# Patient Record
Sex: MALE | Race: WHITE | NOT HISPANIC OR LATINO | Employment: FULL TIME | URBAN - METROPOLITAN AREA
[De-identification: names, ages, dates, MRNs, and addresses within clinical notes are randomized per-mention and may not be internally consistent; named-entity substitution may affect disease eponyms.]

---

## 2019-01-31 ENCOUNTER — PATIENT OUTREACH (OUTPATIENT)
Dept: PLASTIC SURGERY | Facility: CLINIC | Age: 34
End: 2019-01-31

## 2019-01-31 DIAGNOSIS — F64.0 GENDER DYSPHORIA IN ADULT: Primary | ICD-10-CM

## 2019-01-31 NOTE — PROGRESS NOTES
[facility]:  Care Coordination Note     SITUATION   Patient is a 33 year old who is receiving support for:  Clinic Care Coordination - Initial  .    BACKGROUND     New patient requesting top surgery consult with Dr. Lucas.     ASSESSMENT     Surgery              [facility] [place]  [facility] ([facility] En[place]ment: Enrolled  Patient has a therapist: Yes  Name of therapist: Meenakshi Rodriguez at [facility]  Letter of support #1: Requested  Surgery being considered: Yes  Mastectomy: Yes    Pt reports she does not smoke, no diabetes.       PLAN          Nursing Interventions:   [facility] program and services reviewed; [facility] referral placed. Pt registration updated and scheduled for consultation. Surgical process reviewed including letter of support, insurance coverage, PA and scheduling process.     Follow-up plan:  Pt to work with therapist to obtain a letter of support & will bring to consultation.        Arden Wylie

## 2019-02-11 ENCOUNTER — PATIENT OUTREACH (OUTPATIENT)
Dept: PLASTIC SURGERY | Facility: CLINIC | Age: 34
End: 2019-02-11

## 2019-02-11 NOTE — PROGRESS NOTES
Pt LVM stating they have questions regarding insurance. Pt was unable to MyChart message and requested writer start a MyChart message so they can respond.

## 2019-04-02 ENCOUNTER — OFFICE VISIT (OUTPATIENT)
Dept: FAMILY MEDICINE | Facility: CLINIC | Age: 34
End: 2019-04-02
Payer: COMMERCIAL

## 2019-04-02 VITALS
SYSTOLIC BLOOD PRESSURE: 119 MMHG | TEMPERATURE: 98 F | DIASTOLIC BLOOD PRESSURE: 76 MMHG | HEART RATE: 80 BPM | OXYGEN SATURATION: 98 % | BODY MASS INDEX: 32.77 KG/M2 | WEIGHT: 209.2 LBS

## 2019-04-02 DIAGNOSIS — F64.0 GENDER DYSPHORIA IN ADULT: Primary | ICD-10-CM

## 2019-04-02 DIAGNOSIS — Z97.5 IUD (INTRAUTERINE DEVICE) IN PLACE: ICD-10-CM

## 2019-04-02 DIAGNOSIS — Z76.89 ENCOUNTER TO ESTABLISH CARE: ICD-10-CM

## 2019-04-02 DIAGNOSIS — L30.9 ECZEMA, UNSPECIFIED TYPE: ICD-10-CM

## 2019-04-02 ASSESSMENT — ENCOUNTER SYMPTOMS: HEADACHES: 0

## 2019-04-02 NOTE — PATIENT INSTRUCTIONS
Please see Dr Houston for ongoing primary care-I recommend a general physical exam within the next few months  You will need to see her at least prior to surgery-this may be a good time for the physical exam  Let us know if you need more potent steroid cream in the future for eczema  Keep moisturizing!!    It was great meeting you today

## 2019-04-02 NOTE — PROGRESS NOTES
HPI       Mami Gotti is a 33 year old who presents to establish care.  Also present today was resident physician Dr. Rishabh Houston.    Gender dysphoria:   -Referred from Arden Wylie at [facility] for primary care  -Consultation in July with Dr. Lucas for top surgery  -Haven't needed medical care for quite a while and is here to establish care and prepare for surgery  -Pronouns are they/them especially to people that do not know them well, may change and can fluctuate  -Not interested in any hormonal treatment at this time, and not discussed further    PMH:  -Had IUD 2+ years ago through [facility]  -Last pap around 2 years ago-normal. No menstrual bleeding x 2 years  -Eczema-most severe under breasts, one of their reasons for top surgery. Wears sports bra.   -Occasional migraine - on no meds for this    PSH: None    Family Hx: Dad recently diagnosed with melanoma and lung cancer    Meds: Zyrtec and topical steroid-haven't used topicals in a while    Social history:   Lives in [place] with partner  Works at [employer] in [place]  Grew up in [place]    Wondering about general check up    Sees a therapist, Meenakshi Rodriguez, since last August.    Problem, Medication and Allergy Lists were reviewed and updated if needed..    Patient is a new patient to this clinic and so I reviewed/updated the Past Medical History, the Family History and the Social History   Past Medical History:   Diagnosis Date     Bulimia nervosa 2000    in [employer] program     Eczema      Family History   Problem Relation Age of Onset     Hypertension Mother      Hypertension Maternal Grandmother      Hypertension Maternal Grandfather      Glaucoma Father      Cancer Father 72        melanoma and lung cancer     Social History     Socioeconomic History     Marital status: Single     Spouse name: None     Number of children: None     Years of education: None     Highest education level: None   Occupational History     None   Social Needs      Financial resource strain: None     Food insecurity:     Worry: None     Inability: None     Transportation needs:     Medical: None     Non-medical: None   Tobacco Use     Smoking status: Former Smoker     Packs/day: 1.00     Years: 6.00     Pack years: 6.00     Types: Cigarettes     Last attempt to quit: 1/3/2013     Years since quittin.2     Smokeless tobacco: Never Used   Substance and Sexual Activity     Alcohol use: No     Drug use: No     Sexual activity: Yes     Partners: Female   Lifestyle     Physical activity:     Days per week: None     Minutes per session: None     Stress: None   Relationships     Social connections:     Talks on phone: None     Gets together: None     Attends Latter-day service: None     Active member of club or organization: None     Attends meetings of clubs or organizations: None     Relationship status: None     Intimate partner violence:     Fear of current or ex partner: None     Emotionally abused: None     Physically abused: None     Forced sexual activity: None   Other Topics Concern     Parent/sibling w/ CABG, MI or angioplasty before 65F 55M? No   Social History Narrative     None          Review of Systems:   Review of Systems   Eyes: Negative for visual disturbance.   Skin: Positive for rash (ezcema ).   Neurological: Negative for headaches.          Physical Exam:     Vitals:    19 0803   BP: 119/76   Pulse: 80   Temp: 98  F (36.7  C)   TempSrc: Oral   SpO2: 98%   Weight: 94.9 kg (209 lb 3.2 oz)     Body mass index is 32.77 kg/m .  Vitals were reviewed and were normal     Physical Exam   Constitutional: Mami Gotti is oriented to person, place, and time. Mami Gotti appears well-developed and well-nourished. No distress.   Appears anxious, pleasant, joking and smiling   HENT:   Head: Normocephalic and atraumatic.   Eyes: Conjunctivae are normal.   Pulmonary/Chest: Effort normal.   Neurological: Mami Gotti is alert and  oriented to person, place, and time.   Skin: Rash (on dorsum of both hands, mildly erythematous scaling with no excoriations, consitent with eczema ) noted. Mami Gotti is not diaphoretic.   Psychiatric: Maim Gotti has a normal mood and affect. Judgment and thought content normal.   Anxious appearing   Vitals reviewed.      Results:   No testing ordered today    Assessment and Plan    Mami is a 33 year old who seen today to establish care and to discuss future surgery and goals/gender support.     Diagnoses and all orders for this visit:    1. Gender dysphoria in adult  No interest in starting hormones yet but this may change. Seeing Dr. Lucas for potential top surgery, consultation this summer. Sees a therapist, Meenakshi Rodriguez, since last August, encouraged patient to continue seeing therapist for support.     2. Encounter to establish care  Will follow up with Dr. Houston in the next few weeks- month(s) for a general physical exam. They will also need to be seen for a pre op exam once a date is set for surgery with Dr. Lucas.     3. Eczema, unspecified type  Chronic, controlled, seems to be clearing up (worsens in winter months). Uses eucerin liberally, told patient to take showers in colder temps along with continuing to moisturize. They may need more steroid cream at next visit, but not interested at this time    -- RTC within the next few months for physical exam, ongoing primary care [facility]    The visit was 40 minutes > 1/2 of the visit was spent in counseling and coordination of care.

## 2019-07-02 ENCOUNTER — PRE VISIT (OUTPATIENT)
Dept: SURGERY | Facility: CLINIC | Age: 34
End: 2019-07-02

## 2019-07-02 NOTE — TELEPHONE ENCOUNTER
FUTURE VISIT INFORMATION      FUTURE VISIT INFORMATION:    Date: 7/9/19    Time: 9:00am    [place] [facility]  REFERRAL INFORMATION:    Referring provider:  Self    Referring providers clinic:  N/A    Reason for visit/diagnosis  Top surgery    RECORDS REQUESTED FROM:       No records to collect

## 2019-07-09 ENCOUNTER — MEDICAL CORRESPONDENCE (OUTPATIENT)
Dept: HEALTH INFORMATION MANAGEMENT | Facility: CLINIC | Age: 34
End: 2019-07-09

## 2019-07-09 ENCOUNTER — OFFICE VISIT (OUTPATIENT)
Dept: PLASTIC SURGERY | Facility: CLINIC | Age: 34
End: 2019-07-09
Payer: COMMERCIAL

## 2019-07-09 ENCOUNTER — PATIENT OUTREACH (OUTPATIENT)
Dept: PLASTIC SURGERY | Facility: CLINIC | Age: 34
End: 2019-07-09

## 2019-07-09 VITALS — WEIGHT: 185.4 LBS | HEIGHT: 67 IN | BODY MASS INDEX: 29.1 KG/M2

## 2019-07-09 DIAGNOSIS — F64.0 GENDER DYSPHORIA IN ADULT: Primary | ICD-10-CM

## 2019-07-09 RX ORDER — DUPILUMAB 300 MG/2ML
INJECTION, SOLUTION SUBCUTANEOUS
COMMUNITY
Start: 2019-06-19 | End: 2021-04-03

## 2019-07-09 RX ORDER — TACROLIMUS 1 MG/G
OINTMENT TOPICAL PRN
COMMUNITY
Start: 2019-05-30 | End: 2021-04-03

## 2019-07-09 ASSESSMENT — MIFFLIN-ST. JEOR: SCORE: 1578.6

## 2019-07-09 ASSESSMENT — PAIN SCALES - GENERAL: PAINLEVEL: NO PAIN (0)

## 2019-07-09 NOTE — LETTER
"2019       RE: Mami Gotti  [address]  [facility] [address]     Dear Colleague,    Thank you for referring your patient, Mami Gotti, to the [facility] PLASTIC [facility] RECONSTRUCTIVE SURGERY at [facility]. Please see a copy of my visit note below.    PLASTICS NEW TOP   HPI: This is a 33 year old biological female who identifies as nonbinary, though is flexible with their identity, with a history of gender dysphoria and bulimia nervosa who presents today on their own for a consultation for top surgery. They prefer pronouns they/them and preferred name is Mami or Roscoe. They were referred by the internet and previous patients. Their therapist is Meenakshi Rodriguez at [facility] in [place], who wrote their letter of support which they brought with them today. Their primary care is with [facility]. They are not on testosterone and do not plan to begin hormones at present. They have been transitioning and living their chosen gender identity/role for the past year. They bind with GC2B, which they began about a year ago. They do not bind all the time. No breast lumps, skin puckering, nipple drainage, or other breast problems. No history of breast imaging, including mammogram or breast ultrasound.     Medical Hx: Gender dysphoria. They are recovering from bulimia nervosa with treatment 7 years ago, with a recurrence over the past few months; this is being monitored by their therapist. They have a history of anxiety, but are not on medication. No history of depression. History of eczema. No history of asthma, diabetes mellitus, or GERD. Mami has an IUD. They are a redhead, and have \"sensitive skin\" but no bleeding, clotting, healing or scarring problems.     Surgical Hx: History of third molar extractions. Oral surgeries as a child. No other surgical history, including appendectomy or tonsillectomy.     Family Hx: Maternal grandmother  of either breast or " "lung cancer. No known family history of ovarian cancer. Father was diagnosed with melanoma and lung cancer in the past year. Mom and brothers are healthy.     Social Hx: Occupation: Works at [employer] as a  among other tasks, which requires heavy lifting. Relationship status/family: Partner's name is Kelsi who will provide postop cares; Mami lives with their partner. Parents are retired. Family is in the medical field- nursing. Smoking status: Former smoker, nonsmoker for several years. Alcohol use: sober for several years. Diet: Omnivore, red and white meat are protein sources. Moderate processed food, main cook at home,  but low fast food. No caffeine for the past year. Exercise: Has a physical job, no other exercise. Sleep: 8-10 hours per night. No insomnia, nightmares or PTSD.     Vitals: Ht 1.702 m (5' 7\")   Wt 94.8 kg (209 lb)   BMI 32.73 kg/m     PE: General: Height: 5' 7\" Weight: 209 lbs 0 oz   Chest: Tattoo across the top of the chest. Nice upper chest contour. Left breast is slightly (100-200 grams) larger than the right. Faint striae bilaterally. Grade 3 ptosis. Breasts are close to the midline. Inframammary folds are equal bilaterally but are 3-4 cm lower than pectoralis origin. Moderate lateral thoracic rolls. Tattoo on right flank. Fibrous breast tissue. No lymphadenopathy or masses.   Photos taken with consent.     A&P: 33 year old biological female who is nonbinary/gender fluid who is a good candidate for gender affirming top surgery with one of the following: bilateral simple mastectomy without nipple graft reconstruction vs. subcutaneous mastectomy without nipple graft reconstruction vs. breast reduction without nipple graft  reconstruction. Most likely, they will need bilateral simple with NO nipple graft reconstruction, depending on intraoperative findings and the patient's desired outcome. The patient will need a pre-op mammogram and they will also need a history and physical. Their letter " of support was written by their therapist; they brought this with them today. Given their bulimia nervosa, they had questions about how weight gain affects post-op results which we discussed.     They also met with our Transgender Coordinator to discuss communications with staff and timeline. Any further discussion of risks and complications will be reviewed during the pre-op visit.     Since we received the therapist letter of support today, once we receive the mammogram results we will initiate the prior authorization process.     According to [place] Case Law and [facility] standards of care with an appropriate letter of support form a mental health provider top surgery/mastectomy is medically necessary for the treatment of gender dysphoria.     Total time = 45 minutes, over half of which spent discussing surgical options    This note was prepared on behalf of Rajwinder Lucas MD, by Ainsley Elaine, a trained medical scribe, based on my observations and the provider's statements to me.     Again, thank you for allowing me to participate in the care of your patient.      Sincerely,    Rajwinder Lucas MD

## 2019-07-09 NOTE — PROGRESS NOTES
"PLASTICS NEW TOP   HPI: This is a 33 year old biological female who identifies as nonbinary, though is flexible with their identity, with a history of gender dysphoria and bulimia nervosa who presents today on their own for a consultation for top surgery. They prefer pronouns they/them and preferred name is Mami or Roscoe. They were referred by the internet and previous patients. Their therapist is Meenakshi Rodriguez at [facility] in [place], who wrote their letter of support which they brought with them today. Their primary care is with [facility]. They are not on testosterone and do not plan to begin hormones at present. They have been transitioning and living their chosen gender identity/role for the past year. They bind with GC2B, which they began about a year ago. They do not bind all the time. No breast lumps, skin puckering, nipple drainage, or other breast problems. No history of breast imaging, including mammogram or breast ultrasound.     Medical Hx: Gender dysphoria. They are recovering from bulimia nervosa with treatment 7 years ago, with a recurrence over the past few months; this is being monitored by their therapist. They have a history of anxiety, but are not on medication. No history of depression. History of eczema. No history of asthma, diabetes mellitus, or GERD. Mami has an IUD. They are a redhead, and have \"sensitive skin\" but no bleeding, clotting, healing or scarring problems.     Surgical Hx: History of third molar extractions. Oral surgeries as a child. No other surgical history, including appendectomy or tonsillectomy.     Family Hx: Maternal grandmother  of either breast or lung cancer. No known family history of ovarian cancer. Father was diagnosed with melanoma and lung cancer in the past year. Mom and brothers are healthy.     Social Hx: Occupation: Works at [employer] as a  among other tasks, which requires heavy lifting. Relationship status/family: Partner's name is Kelsi who will " "provide postop cares; Mami lives with their partner. Parents are retired. Family is in the medical field- nursing. Smoking status: Former smoker, nonsmoker for several years. Alcohol use: sober for several years. Diet: Omnivore, red and white meat are protein sources. Moderate processed food, main cook at home,  but low fast food. No caffeine for the past year. Exercise: Has a physical job, no other exercise. Sleep: 8-10 hours per night. No insomnia, nightmares or PTSD.     Vitals: Ht 1.702 m (5' 7\")   Wt 94.8 kg (209 lb)   BMI 32.73 kg/m    PE: General: Height: 5' 7\" Weight: 209 lbs 0 oz   Chest: Tattoo across the top of the chest. Nice upper chest contour. Left breast is slightly (100-200 grams) larger than the right. Faint striae bilaterally. Grade 3 ptosis. Breasts are close to the midline. Inframammary folds are equal bilaterally but are 3-4 cm lower than pectoralis origin. Moderate lateral thoracic rolls. Tattoo on right flank. Fibrous breast tissue. No lymphadenopathy or masses.   Photos taken with consent.     A&P: 33 year old biological female who is nonbinary/gender fluid who is a good candidate for gender affirming top surgery with one of the following: bilateral simple mastectomy without nipple graft reconstruction vs. subcutaneous mastectomy without nipple graft reconstruction vs. breast reduction without nipple graft  reconstruction. Most likely, they will need bilateral simple with NO nipple graft reconstruction, depending on intraoperative findings and the patient's desired outcome. The patient will need a pre-op mammogram and they will also need a history and physical. Their letter of support was written by their therapist; they brought this with them today. Given their bulimia nervosa, they had questions about how weight gain affects post-op results which we discussed.     They also met with our Transgender Coordinator to discuss communications with staff and timeline. Any further discussion of " risks and complications will be reviewed during the pre-op visit.     Since we received the therapist letter of support today, once we receive the mammogram results we will initiate the prior authorization process.     According to [place] Case Law and [facility] standards of care with an appropriate letter of support form a mental health provider top surgery/mastectomy is medically necessary for the treatment of gender dysphoria.     Total time = 45 minutes, over half of which spent discussing surgical options    This note was prepared on behalf of Rajwinder Lucas MD, by Ainsley Elaine, a trained medical scribe, based on my observations and the provider's statements to me.

## 2019-07-09 NOTE — PROGRESS NOTES
[facility]:  Care Coordination Note     SITUATION   Patient is a 33 year old who is receiving support for:  Clinic Care Coordination - Initial (Top surgery consultation - [facility])  .    BACKGROUND     Pt attended hospitals consultation with .     Pt previously established care with [facility].     Pt prefers strangers use They/Them Pronouns and not gender them.     ASSESSMENT     Surgery              [facility] Assessment  [facility] [place]: Enrolled  Patient has a therapist: Yes  Name of therapist: Meenakshi Rodriguez at [facility]  Therapist's phone number: [phone]  Letter of support #1: Received  Letter #1 Date: 07/06/19  Surgery being considered: Yes  Mastectomy: Yes          PLAN          Nursing Interventions:   [facility] program and services discussed with patient. Educational surgical packet provided and reviewed with patient. Process for accessing surgery discussed, including: [facility] standards of care, letters of support, treatment plan action steps, PA insurance process, surgery scheduling, and approximate timeline.     Pt has letter of support in chart, which is adequate for PA. AMI and photography consent signed by patient. Pt questions answered within scope of practice.     Follow-up plan:  See Dr. Lucas's note.    Letter is adequate for PA.        Arden Wylie

## 2019-07-09 NOTE — NURSING NOTE
"Chief Complaint   Patient presents with     Consult     Pt here for consult for top surgery       Vitals:    07/09/19 0857   Weight: 94.8 kg (209 lb)   Height: 1.702 m (5' 7\")       Body mass index is 32.73 kg/m .      JENAE SmartT    No vitals taken per provider  "

## 2019-07-25 DIAGNOSIS — F64.0 GENDER DYSPHORIA IN ADULT: Primary | ICD-10-CM

## 2019-07-26 NOTE — PROGRESS NOTES
Order placed for screening mammogram per surgical recommendation (see note from 07/09/19). Will need to be seen for H&P prior to proposed surgery.     Rishabh Houston MD   [facility] PGY-2  [phone]

## 2019-07-31 ENCOUNTER — HOSPITAL ENCOUNTER (OUTPATIENT)
Dept: MAMMOGRAPHY | Facility: CLINIC | Age: 34
Discharge: HOME | End: 2019-07-31
Attending: FAMILY MEDICINE | Admitting: FAMILY MEDICINE
Payer: COMMERCIAL

## 2019-07-31 DIAGNOSIS — F64.0 GENDER DYSPHORIA IN ADULT: ICD-10-CM

## 2019-07-31 PROCEDURE — 77067 SCR MAMMO BI INCL CAD: CPT

## 2019-08-01 ENCOUNTER — PATIENT OUTREACH (OUTPATIENT)
Dept: PLASTIC SURGERY | Facility: CLINIC | Age: 34
End: 2019-08-01

## 2019-08-01 NOTE — PROGRESS NOTES
[facility]:  Care Coordination Note     SITUATION   Mami Gotti is a 33 year old adult who is receiving support for:  Gender Dysphoria.    BACKGROUND     Pt attended top consultation with .     ASSESSMENT     Surgery              [facility] Assessment  [facility] ([facility] Enrollment: Enrolled  Name of therapist: Meenakshi Rodriguez at [facility]  Therapist's phone number: [phone]  Letter of support #1: Received  Letter #1 Date: 07/06/19  Surgery being considered: Yes  Mastectomy: Yes  Mammogram completed: Yes(7/31/19 Negative)  7/31/19 11:54 AM [id number] [facility] [place] [facility]    Assessment Category     Negative [1]   PACS Images      Show images for Screening Mammogram Digital Bilateral   Study Result     SCREENING MAMMOGRAM, BILATERAL, DIGITAL w/CAD - 7/31/2019 11:54 AM.     BREAST SYMPTOMS: No current breast complaints.      COMPARISON:  Baseline.     BREAST DENSITY: Heterogeneously dense.     COMMENTS: No findings of suspicion for malignancy.                                                                       IMPRESSION: BI-RADS CATEGORY: 1 - Negative.     RECOMMENDED FOLLOW-UP: Annual Mammography.  Recommend routine annual screening mammography.     Exam results letter mailed to patient.                PLAN     Nursing Interventions: LOS previously reviewed by trans care coordinator, adequate and ready for PA. Mammogram complete 7/31/19 results negative.       Follow-up plan:  Message routed to trans care coordinator and . Ready to PA.       Lore Ho

## 2019-08-06 DIAGNOSIS — F64.0 GENDER DYSPHORIA IN ADOLESCENT AND ADULT: Primary | ICD-10-CM

## 2019-08-06 RX ORDER — CLINDAMYCIN PHOSPHATE 900 MG/50ML
900 INJECTION, SOLUTION INTRAVENOUS
Status: CANCELLED | OUTPATIENT
Start: 2019-08-06

## 2019-08-06 RX ORDER — CLINDAMYCIN PHOSPHATE 900 MG/50ML
900 INJECTION, SOLUTION INTRAVENOUS SEE ADMIN INSTRUCTIONS
Status: CANCELLED | OUTPATIENT
Start: 2019-08-06

## 2019-08-07 ENCOUNTER — TELEPHONE (OUTPATIENT)
Dept: SURGERY | Facility: CLINIC | Age: 34
End: 2019-08-07

## 2019-08-07 NOTE — TELEPHONE ENCOUNTER
Left message to schedule procedure with Dr Rome Lucas     Details left with my direct contact number for scheduling.     Nella Reed  [facility] Surgical Coordinator  [phone]

## 2019-08-08 ENCOUNTER — TELEPHONE (OUTPATIENT)
Dept: SURGERY | Facility: CLINIC | Age: 34
End: 2019-08-08

## 2019-08-08 NOTE — TELEPHONE ENCOUNTER
Spoke with patient to schedule surgery with Dr Rome Lucas      Surgery was scheduled on 9/25 at [facility]    Patient will have pre-surgery visit with PCP on      -Patient advised H&P is needed or surgery cancellation may occur    Post-Op care appointment scheduled?  YES on 10/1    Patient is aware a / is needed day of surgery.     Surgery packet was sent via [employer], patient has my direct contact information for any further questions.     Nella Reed  Surgical [facility] Coordinator  [phone]

## 2019-08-08 NOTE — TELEPHONE ENCOUNTER
Spoke with patient, they are happy to know that surgery dates are available Sept/Oct. Mami will check with work to see what time frame works best for surgery.

## 2019-08-16 ENCOUNTER — TELEPHONE (OUTPATIENT)
Dept: SURGERY | Facility: CLINIC | Age: 34
End: 2019-08-16

## 2019-08-16 NOTE — TELEPHONE ENCOUNTER
received VM from patient, would like quotes, will send message to Dr Lucas, patient can also go to  for surgery

## 2019-08-16 NOTE — TELEPHONE ENCOUNTER
received Ginny LUTZ denial #[id number]  because this is not a covered service under patients policy., not denied for any other reason

## 2019-08-29 ENCOUNTER — TELEPHONE (OUTPATIENT)
Dept: SURGERY | Facility: CLINIC | Age: 34
End: 2019-08-29

## 2019-08-29 NOTE — TELEPHONE ENCOUNTER
received another phone call from patient, asking about quotes, have not received yet, email to Guadalupe Montoya Ejay, Jolene

## 2019-09-10 ENCOUNTER — OFFICE VISIT (OUTPATIENT)
Dept: PLASTIC SURGERY | Facility: CLINIC | Age: 34
End: 2019-09-10
Payer: COMMERCIAL

## 2019-09-10 DIAGNOSIS — F64.0 GENDER DYSPHORIA IN ADULT: Primary | ICD-10-CM

## 2019-09-10 ASSESSMENT — PAIN SCALES - GENERAL: PAINLEVEL: NO PAIN (0)

## 2019-09-10 NOTE — PROGRESS NOTES
PLASTICS PRE-OP  This is a 34 year old year old nonbinary individual who presents with their partner for their pre-op visit prior to bilateral simple mastectomy without nipple graft reconstruction scheduled for 9/25/2019. The patient has had a negative mammogram, and their letter of support has been received from their therapist. History and physical are pending; scheduled for next week.     My RN, Lore Hawk, discussed periop instructions with the patient including: not eating anything 8 hours prior to surgery, drinking clear liquids up to 2 hours before surgery except for morning medications with a sip of water, the preop shower with surgical soap, and wearing a button- or zip-up shirt on the day of surgery. She also instructed Mami to not take any NSAIDs the week prior to surgery, but they may take Tylenol post-op for pain as needed. She also gave the patient a folder with information on vannesa-op topics, including where the surgery will be.     I discussed the following with the patient; preop, intraop and postop phases of care on the day of surgery, the placement of a bladder catheter during surgery that will likely be removed in recovery, postop cares and limitations with relation to home and work settings, 5-lb weight restriction for the first 3 weeks postop, and how long to maintain limited activities. We also discussed Zofran, oxycodone, Z-mony, and antipruritics which will be prescribed. We discussed that preventing constipation will be their responsibility, and we discussed methods such as aloe, prune juice or Miralax.     In addition, we went over the possible risks and complications involved with this elective procedure. These include but are not limited to: infection, bleeding, hematoma/seroma formation, poor healing - including dehiscence or hypertrophic scarring. Altered chest sensation - either hypo or hypersensitive, residual deformities and asymmetries, possible further surgical revisions,  possible injury to surrounding neurovascular and musculoskeletal structures, including intra-axillary or intra-thoracic, anesthetic risks such as DVT/PE or cardiopulmonary events.      I gave Mami some samples of products to trial, that would be used post op, that hopefully will not irritate their skin. They will let me know if there are any significant skin reactions given their eczema. They also will bring FMLA paperwork either on the DOS or to their first po-op visit in clinic. All questions were answered. They will bring any other questions that arise to the day of surgery.    Total time = 20 minutes, all spent educating about upcoming procedure.    This note was prepared on behalf of Rajwinder Lucas MD, by Ainsley Bella, a trained medical scribe, based on my observations and the provider's statements to me.

## 2019-09-10 NOTE — PATIENT INSTRUCTIONS
Bilateral Mastectomy   Pre and Post op Surgery Instructions     You are scheduled for Bilateral mastectomy without nipple grafting on 9/25/19 at 1:00pm.     You will need to arrive at 11:00am at the [facility].  [facility] [place] [address]. You can park in the Green Lot and check in on 3rd floor. (See attached map).     Before Surgery:  - 8 hours prior to surgery stop eating solid food. You can continue to drink clear liquids (water, apple juice, Gatorade) up to 2 hours before surgery. If you have any medications that need to be taken in this timeframe, you can take them with a small sip of water    - No Ibuprofen, Advil, Aleve, Naproxen, Motrin, Aspirin for 7 days prior to surgery. If you are having pain in the week before surgery Tylenol is okay to take.    - Wear a button up or zip up shirt    - Wash with surgical soap:      Showering with an antiseptic soap prior to an invasive procedure will decrease the  bacteria that is normally found on the skin.     Using 1/2 of the bottle for each application.  Be sure to use the whole bottle before coming to surgery.    The evening before surgery, shower or bathe as you normally would, using your preferred soap.  Give special attention to areas where the incisions will be made. Rinse thoroughly.  You may wash your hair with your regular shampoo.     Next wash your entire body, from the chin down, with the special antiseptic soap (full strength) using a freshly laundered clean washcloth, again giving special attention to areas where incisions will be made.    Rinse thoroughly and dry off using a freshly laundered clean towel.     Wear clean clothes/pajamas and sleep on clean sheets    Repeat steps 2 and 3 in the morning before coming to surgery (using the rest of the bottle of soap).      Events of day:   - After you check in you will meet with a pre-op nurse. They will go over your medications, review your H&P  "(pre-op physical), have you change into a paper gown, and your chest will be wiped again with additional soap (some people have a reaction to the soap, if you have a reaction to the surgical soap let the nurse know).     - They will also place compression boots on both legs to help decrease risk of blood clots.     - You may be asked to complete a pregnancy test. If you have had no exposure to sperm, you can decline.    -You will meet with the anesthesiologists and nurse anesthetist who will be keeping you asleep during surgery, placing an IV, and monitoring you throughout the surgery.    -You will meet with the RN as you are being moved into operating room, they will be helping to position you and keep you comfortable during the surgery.     - Dr. Lucas will meet you in the pre-op area to discuss any questions about surgery, make markings on your chest for planning, and to sign your consent.     - A catheter will be placed after you are sleeping and is typically removed before you wake. The longest this would typically be in is in the post-op recovery room if needed.     - There will be straps and pillows to help position you and keep you in place.    - The tissue that is removed  will be sent to pathology and then discarded.     -STEFF drains will be placed (one in each armpit) and a pain catheter will be placed in the center of your chest.     -Closure is done with dissolvable sutures under the skin and is then sealed with glue, and tape.    - Post-Op medications you will be given in addition to the anesthesia include; Zofran (nausea), Oxycodone (pain), Z-mony (infection), and antipruritics (itching).     - You can usually go home the same day so long as you are eating, drinking, voiding, and pain is well controlled.  You will be sent home with all needed supplies.       Post-Op Cares:   -No lifting over 5 lbs for 3 weeks    -No stretching arms out or above the head (\"modified T-titus\")    -Walk around frequently to " help prevent blood clots    -Deep breath to prevent pneumonia    -No sleeping on stomach, if it is difficult not to roll over onto your stomach you can sleep in a recliner or use additional pillows    -No ice packs or heat packs    -Preventing constipation will be your responsibility. You can use whatever method works best for you such as; aloe, prune juice, Sennokot or Miralax.    It is okay to shower if needed, once or twice throughout the week (not daily).       Drain Care:  You will be discharged with Johnathan-Uribe (STEFF) drainage tubes.  These tubes drain fluid from your incision, helping to prevent swelling and reducing the risk for infection.  The tube is held in place by a few stitches. Pin your STEFF drain to your clothing by using a safety pin through the plastic loop on the top of the bulb. If the drain is not attached to your clothing, it may pull out from under your skin. Drains are uncomfortable!    Emptying the drain bulb: The measuring cup provided by the hospital or a measuring cup that is used only for the STEFF drain.  1. Wash your hands with soap and water.  2. Hold the bulb securely.  3. Remove the drainage plug from the emptying port.  4. Carefully turn the bulb upside down over the measuring cup, and gently squeeze all of the drainage into the measuring cup.  5. Squeeze the middle of the bulb firmly so that the bulb is as flat as possible.                                                                                                                                                    6. While still squeezing the bulb, replace the drainage plug. This step is important to keep the drain suction  7. Measure how much fluid you removed from the bulb.  8. Write down the amount and color of the fluid you removed from the bulb. If  you have more than one drain, keep a separate record for each one.    9. Empty the fluid into the toilet and flush.  10. Rinse the measuring cup, and wash your hands with soap and  water.  11. You should empty the drain at least two times each day, in the morning and at bedtime.  12. Drainage tubes are removed when the output is less than 20ml in a 24 hour period for 2 consecutive days.  13. Output will be somewhere between 30 to 50 mLs per day, but don't be alarmed if it is more or less. Output may not be equal between sides. Amounts will probably decrease over time.  14. The color coming from the drains may vary from deep red, light pink, or clear yellow.    Stripping the tube:  To prevent clots from blocking the drain, you will need to  strip  it. Stripping means that you use your fingers to squeeze along the length of the drain to help maintain the flow of drainage.    Wash your hands.    Using one hand, firmly hold the tubing near the insertion site (as close to your skin as the ace wrap and gauze dressing will allow). This will prevent the drain from being pulled out while you are stripping it and from pulling on skin and sutures.    [place] upper/top fingers and milk with the bottom or lower fingers.    Using your index finger and thumb of the other hand, squeeze the tubing below the  first hand. You should squeeze it firmly enough so the tubing becomes flat.     Maintain pressure on the tube, as you are squeezing, slide your index finger and thumb down the tube about 4-6 inches toward the bulb. (use alcohol wipes or lotion to help).    Then, release the hand closest to where the tube is attached to the body (making sure to keep pressure with the other hand), and move upper/anchor hand down. Squeeze, Repeat in segments.    Do not release the pressure you are creating in the tubing until you reach the bulb.  The whole tube will be flat.     If at any time it feels like the tube is pulling away from the skin or starts to hurt STOP! Then start over again more gently.     Strip the drain each time you empty it.

## 2019-09-10 NOTE — LETTER
9/10/2019       RE: Mami Gotti  [address]  [facility] [address]     Dear Colleague,    Thank you for referring your patient, Mami Gotti, to the [facility] PLASTIC AND RECONSTRUCTIVE SURGERY at [facility]. Please see a copy of my visit note below.    PLASTICS PRE-OP  This is a 34 year old year old nonbinary individual who presents with their partner for their pre-op visit prior to bilateral simple mastectomy without nipple graft reconstruction scheduled for 9/25/2019. The patient has had a negative mammogram, and their letter of support has been received from their therapist. History and physical are pending; scheduled for next week.     My RN, Lore Hawk, discussed periop instructions with the patient including: not eating anything 8 hours prior to surgery, drinking clear liquids up to 2 hours before surgery except for morning medications with a sip of water, the preop shower with surgical soap, and wearing a button- or zip-up shirt on the day of surgery. She also instructed Mami to not take any NSAIDs the week prior to surgery, but they may take Tylenol post-op for pain as needed. She also gave the patient a folder with information on vannesa-op topics, including where the surgery will be.     I discussed the following with the patient; preop, intraop and postop phases of care on the day of surgery, the placement of a bladder catheter during surgery that will likely be removed in recovery, postop cares and limitations with relation to home and work settings, 5-lb weight restriction for the first 3 weeks postop, and how long to maintain limited activities. We also discussed Zofran, oxycodone, Z-mony, and antipruritics which will be prescribed. We discussed that preventing constipation will be their responsibility, and we discussed methods such as aloe, prune juice or Miralax.     In addition, we went over the possible risks and complications involved  with this elective procedure. These include but are not limited to: infection, bleeding, hematoma/seroma formation, poor healing - including dehiscence or hypertrophic scarring. Altered chest sensation - either hypo or hypersensitive, residual deformities and asymmetries, possible further surgical revisions, possible injury to surrounding neurovascular and musculoskeletal structures, including intra-axillary or intra-thoracic, anesthetic risks such as DVT/PE or cardiopulmonary events.      I gave Mami some samples of products to trial, that would be used post op, that hopefully will not irritate their skin. They will let me know if there are any significant skin reactions given their eczema. They also will bring FMLA paperwork either on the DOS or to their first po-op visit in clinic. All questions were answered. They will bring any other questions that arise to the day of surgery.    Total time = 20 minutes, all spent educating about upcoming procedure.    This note was prepared on behalf of Rajwinder Lucas MD, by Ainsley Bella, a trained medical scribe, based on my observations and the provider's statements to me.     Again, thank you for allowing me to participate in the care of your patient.      Sincerely,    Rajwinder Lucas MD

## 2019-09-10 NOTE — NURSING NOTE
Chief Complaint   Patient presents with     Pre-Op Exam     Pt here for pre op for top surgery       There were no vitals filed for this visit.    There is no height or weight on file to calculate BMI.      JENAE Smart NREMT                    No vitals taken per provider

## 2019-09-16 ENCOUNTER — OFFICE VISIT (OUTPATIENT)
Dept: FAMILY MEDICINE | Facility: CLINIC | Age: 34
End: 2019-09-16
Payer: COMMERCIAL

## 2019-09-16 VITALS
WEIGHT: 181 LBS | OXYGEN SATURATION: 99 % | SYSTOLIC BLOOD PRESSURE: 129 MMHG | HEART RATE: 57 BPM | BODY MASS INDEX: 28.41 KG/M2 | DIASTOLIC BLOOD PRESSURE: 67 MMHG | RESPIRATION RATE: 16 BRPM | HEIGHT: 67 IN | TEMPERATURE: 98.1 F

## 2019-09-16 DIAGNOSIS — F64.0 GENDER DYSPHORIA IN ADULT: ICD-10-CM

## 2019-09-16 DIAGNOSIS — Z01.818 PRE-OP EXAM: Primary | ICD-10-CM

## 2019-09-16 LAB
% GRANULOCYTES: 66.2 %G (ref 40–75)
BUN SERPL-MCNC: 13 MG/DL (ref 7–19)
CALCIUM SERPL-MCNC: 8.7 MG/DL (ref 8.5–10.1)
CHLORIDE SERPLBLD-SCNC: 106.1 MMOL/L (ref 98–110)
CO2 SERPL-SCNC: 24.8 MMOL/L (ref 20–32)
CREAT SERPL-MCNC: 0.8 MG/DL (ref 0.5–1)
GFR SERPL CREATININE-BSD FRML MDRD: 87.3 ML/MIN/1.7 M2
GLUCOSE SERPL-MCNC: 61.9 MG'DL (ref 70–99)
GRANULOCYTES #: 3.2 K/UL (ref 1.6–8.3)
HCT VFR BLD AUTO: 42.3 % (ref 35–47)
HEMOGLOBIN: 12.5 G/DL (ref 11.7–15.7)
LYMPHOCYTES # BLD AUTO: 1.4 K/UL (ref 0.8–5.3)
LYMPHOCYTES NFR BLD AUTO: 27.8 %L (ref 20–48)
MCH RBC QN AUTO: 27.9 PG (ref 26.5–35)
MCHC RBC AUTO-ENTMCNC: 29.6 G/DL (ref 32–36)
MCV RBC AUTO: 94.4 FL (ref 78–100)
MID #: 0.3 K/UL (ref 0–2.2)
MID %: 6 %M (ref 0–20)
PLATELET # BLD AUTO: 263 K/UL (ref 150–450)
POTASSIUM SERPL-SCNC: 3.2 MMOL/DL (ref 3.3–4.5)
RBC # BLD AUTO: 4.48 M/UL (ref 3.8–5.2)
SODIUM SERPL-SCNC: 139.9 MMOL/L (ref 132.6–141.4)
WBC # BLD AUTO: 4.9 K/UL (ref 4–11)

## 2019-09-16 ASSESSMENT — MIFFLIN-ST. JEOR: SCORE: 1553.64

## 2019-09-16 NOTE — PROGRESS NOTES
[facility]  [address],  [address]  [facility] [address]  Phone: [phone]  Fax: [phone]    9/16/2019    Adult PRE-OP Evaluation:    Mami Charles Shen, 1985 presents for pre-operative evaluation and assessment as requested by Dr. Lucas, prior to undergoing surgery/procedure for gender dysphoria.   Proposed procedure: bilateral simple mastectomy without nipple graft reconstruction   Date of Surgery/ Procedure: 09/25/19  Hospital/Surgical Facility:  [facility]      3rd Floor      Fax: [phone]      Phone: [phone]     Primary Physician: Rishabh Houston  Type of Anesthesia Anticipated: General  History of anesthesia complications: NONE  History of  abnormal bleeding: NONE   History of blood transfusions: NO  Patient has a Health Care Directive or Living Will:  NO    Preoperative Questions   1. NO - Do you have a history of heart attack, stroke, stent, bypass or surgery on an artery in the head, neck, heart or legs?  2. NO - Do you ever have any pain or discomfort in your chest?  3. NO - Have you ever had a severe pain across the front of your chest lasting for half an hour or more?  4. NO - Do you have a history of Congestive Heart Failure?  5. NO - Are you troubled by shortness of breath when: walking on the level/ up a slight hill/ at night?  6. NO - Does your chest ever sound wheezy or whistling?  7. NO - Do you currently have a cold, bronchitis or other respiratory infection?  8. NO - Have you had a cold, bronchitis or other respiratory infection within the last 2 weeks?  9. NO - Do you usually have a cough?  10. NO - Do you sometimes get pains in the calves of your legs when you walk?  11. NO - Do you or anyone in your family have previous history of blood clots?  12. NO - Do you or does anyone in your family have a serious bleeding problem such as prolonged bleeding following surgeries or cuts?  13. NO - Have you ever had  "problems with anemia or been told to take iron pills?  14. NO - Have you had any abnormal blood loss such as black, tarry or bloody stools, or abnormal vaginal bleeding?  15. NO - Have you ever had a blood transfusion?  16. NO - Have you or any of your relatives ever had problems with anesthesia?  17. NO - Do you have sleep apnea, excessive snoring or daytime drowsiness?  18. NO - Do you have any prosthetic heart valves?  19. NO - Do you have prosthetic joints?  20. NO - Is there any chance that you may be pregnant?    Patient Active Problem List   Diagnosis     Hyperlipidemia LDL goal <160     Bulimia nervosa     Alcohol abuse     IUD (intrauterine device) in place       Current Outpatient Medications on File Prior to Visit:  cetirizine (ZYRTEC) 10 MG tablet Take 10 mg by mouth daily   DUPIXENT 300 MG/2ML syringe    tacrolimus (PROTOPIC) 0.1 % external ointment      No current facility-administered medications on file prior to visit.     OTC products: NSAIDS \"every once in a while\"  Zyrtec daily    Allergies   Allergen Reactions     Amoxicillin Hives     Latex Allergy: NO    Social History     Socioeconomic History     Marital status: Single     Spouse name: Not on file     Number of children: Not on file     Years of education: Not on file     Highest education level: Not on file   Occupational History     Not on file   Social Needs     Financial resource strain: Not on file     Food insecurity:     Worry: Not on file     Inability: Not on file     Transportation needs:     Medical: Not on file     Non-medical: Not on file   Tobacco Use     Smoking status: Former Smoker     Packs/day: 1.00     Years: 6.00     Pack years: 6.00     Types: Cigarettes     Last attempt to quit: 1/3/2013     Years since quittin.7     Smokeless tobacco: Never Used   Substance and Sexual Activity     Alcohol use: No     Drug use: No     Sexual activity: Yes     Partners: Female   Lifestyle     Physical activity:     Days per week: Not " "on file     Minutes per session: Not on file     Stress: Not on file   Relationships     Social connections:     Talks on phone: Not on file     Gets together: Not on file     Attends Restorationism service: Not on file     Active member of club or organization: Not on file     Attends meetings of clubs or organizations: Not on file     Relationship status: Not on file     Intimate partner violence:     Fear of current or ex partner: Not on file     Emotionally abused: Not on file     Physically abused: Not on file     Forced sexual activity: Not on file   Other Topics Concern     Parent/sibling w/ CABG, MI or angioplasty before 65F 55M? No   Social History Narrative     Not on file     REVIEW OF SYSTEMS:   CONSTITUTIONAL:NEGATIVE for fever, chills, change in weight  INTEGUMENTARY/SKIN: NEGATIVE for worrisome rashes, History of ezcema on dupixent via dermatologist since July 2019  EYES: NEGATIVE for vision changes or irritation  ENT/MOUTH: NEGATIVE for ear, mouth and throat problems  RESP:NEGATIVE for significant cough or SOB  CV: NEGATIVE for chest pain, palpitations or peripheral edema  GI: NEGATIVE for nausea, abdominal pain, heartburn, or change in bowel habits  MUSCULOSKELETAL: NEGATIVE for significant arthralgias or myalgia  NEURO: NEGATIVE for weakness, dizziness or paresthesias  PSYCHIATRIC: NEGATIVE for changes in mood or affect  EXAM:     Patient Vitals for the past 24 hrs:   BP Temp Temp src Pulse Resp SpO2 Height Weight   09/16/19 1327 129/67 98.1  F (36.7  C) Oral 57 16 99 % 1.702 m (5' 7\") 82.1 kg (181 lb)     Body mass index is 28.35 kg/m .  GENERAL: healthy, alert and no distress, pleasant   EYES: Eyes grossly normal to inspection, extraocular movements - intact, and PERRL  HENT: ear canals- normal; TMs- normal; Nose- normal; Mouth- no ulcers, no lesions  NECK: no tenderness, no adenopathy, no asymmetry, no masses, no stiffness  RESP: lungs clear to auscultation - no rales, no rhonchi, no wheezes  CV: " regular rates and rhythm, normal S1 S2, no S3 or S4 and no murmur, no click or rub  ABDOMEN: soft, no tenderness, no masses, normal bowel sounds  MS: extremities- no gross deformities noted, no edema  SKIN: fair skin, no suspicious lesions, no rashes  NEURO: strength and tone- normal, sensory exam- grossly normal, mentation- intact, speech- normal, reflexes- symmetric on patellar and bicep DTRs  BACK: no paralumbar tenderness, normal range of motion  PSYCH: Alert and oriented times 3; speech- coherent , normal rate and volume; able to articulate logical thoughts  LYMPHATICS: ant. cervical- normal, post. cervical- normal  DIAGNOSTICS:    EKG: Not indicated due to non-vascular surgery and low risk of event (age <65 and without cardiac risk factors)  Hemoglobin (indicated for history of anemia or procedure with significant blood loss such as tonsillectomy, major intraperitoneal surgery, vascular surgery, major spine surgery, total joint replacement)  Serum Potassium  Serum Creatinine  RISK ASSESSMENT:   Cardiovascular Risk:  -Patient is able to participate in strenuous activities without chest pain.  -The patient does not have chest pain with exertion.  -Patient does not have a history of congestive heart failure.    -The patient does not have a history of stroke and does not have a history of valvular disease.    Pulmonary Risk:  -In terms of risk factors for pulmonary complication, the patient has no risk factors    Perioperative Complications:  -The patient does not have a history of bleeding or clotting problems in the past.  -The patient has not had surgery previously.  -The patient does not have a family history of any anesthesia or surgical complications.  IMPRESSION:   Reason for surgery/procedure: gender dysphoria     The proposed surgical procedure is considered INTERMEDIATE risk.    For above listed surgery and anesthesia:   Patient is at low risk for surgery/procedure and perioperative/procedure  complications.  RECOMMENDATIONS:   Labs:  CBC  BMP    Fasting:  NPO for 12 hours prior to surgery    Preop Plan:  --Approval given to proceed with proposed procedure, without further diagnostic evaluation.  --Advised patient to discuss duration of treatment with dupixent medication for refractory ezcema with the prescriber, dermatologist at upcoming appointment     Medications:  Patient should hold their regular medications the morning of surgery unless otherwise instructed.    Hold NSAIDs 7 days prior to surgery.    Rishabh Houston MD    Please contact our office if there are any further questions or information required about this patient.

## 2019-09-16 NOTE — PROGRESS NOTES
Preceptor Attestation:   Patient seen, evaluated and discussed with the resident. I have verified the content of the note, which accurately reflects my assessment of the patient and the plan of care.   Supervising Physician:  Devante Renee MD MD

## 2019-09-24 ENCOUNTER — ANESTHESIA EVENT (OUTPATIENT)
Dept: SURGERY | Facility: CLINIC | Age: 34
End: 2019-09-24

## 2019-09-25 ENCOUNTER — HOSPITAL ENCOUNTER (OUTPATIENT)
Facility: CLINIC | Age: 34
Discharge: HOME | End: 2019-09-25
Attending: SURGERY | Admitting: SURGERY

## 2019-09-25 ENCOUNTER — ANESTHESIA (OUTPATIENT)
Dept: SURGERY | Facility: CLINIC | Age: 34
End: 2019-09-25

## 2019-09-25 VITALS
HEART RATE: 63 BPM | SYSTOLIC BLOOD PRESSURE: 119 MMHG | RESPIRATION RATE: 15 BRPM | DIASTOLIC BLOOD PRESSURE: 73 MMHG | HEIGHT: 67 IN | TEMPERATURE: 97.4 F | BODY MASS INDEX: 28.03 KG/M2 | WEIGHT: 178.57 LBS | OXYGEN SATURATION: 98 %

## 2019-09-25 DIAGNOSIS — Z90.13 S/P BILATERAL MASTECTOMY: Primary | ICD-10-CM

## 2019-09-25 PROCEDURE — 36000059 ZZH SURGERY LEVEL 3 EA 15 ADDTL MIN UMMC: Performed by: SURGERY

## 2019-09-25 PROCEDURE — 25000128 H RX IP 250 OP 636: Performed by: NURSE ANESTHETIST, CERTIFIED REGISTERED

## 2019-09-25 PROCEDURE — 88305 TISSUE EXAM BY PATHOLOGIST: CPT | Performed by: SURGERY

## 2019-09-25 PROCEDURE — 25000566 ZZH SEVOFLURANE, EA 15 MIN: Performed by: SURGERY

## 2019-09-25 PROCEDURE — 37000008 ZZH ANESTHESIA TECHNICAL FEE, 1ST 30 MIN: Performed by: SURGERY

## 2019-09-25 PROCEDURE — 88305 TISSUE EXAM BY PATHOLOGIST: CPT | Mod: 26 | Performed by: SURGERY

## 2019-09-25 PROCEDURE — 25000128 H RX IP 250 OP 636: Performed by: SURGERY

## 2019-09-25 PROCEDURE — 71000012 ZZH RECOVERY PHASE 1 LEVEL 1 FIRST HR: Performed by: SURGERY

## 2019-09-25 PROCEDURE — 25000125 ZZHC RX 250: Performed by: SURGERY

## 2019-09-25 PROCEDURE — 25000125 ZZHC RX 250: Performed by: NURSE ANESTHETIST, CERTIFIED REGISTERED

## 2019-09-25 PROCEDURE — 27210794 ZZH OR GENERAL SUPPLY STERILE: Performed by: SURGERY

## 2019-09-25 PROCEDURE — 27110038 ZZH RX 271: Performed by: SURGERY

## 2019-09-25 PROCEDURE — 25800030 ZZH RX IP 258 OP 636: Performed by: NURSE ANESTHETIST, CERTIFIED REGISTERED

## 2019-09-25 PROCEDURE — 36000057 ZZH SURGERY LEVEL 3 1ST 30 MIN - UMMC: Performed by: SURGERY

## 2019-09-25 PROCEDURE — 25000128 H RX IP 250 OP 636: Performed by: ANESTHESIOLOGY

## 2019-09-25 PROCEDURE — 71000013 ZZH RECOVERY PHASE 1 LEVEL 1 EA ADDTL HR: Performed by: SURGERY

## 2019-09-25 PROCEDURE — 71000027 ZZH RECOVERY PHASE 2 EACH 15 MINS: Performed by: SURGERY

## 2019-09-25 PROCEDURE — 40000170 ZZH STATISTIC PRE-PROCEDURE ASSESSMENT II: Performed by: SURGERY

## 2019-09-25 PROCEDURE — 37000009 ZZH ANESTHESIA TECHNICAL FEE, EACH ADDTL 15 MIN: Performed by: SURGERY

## 2019-09-25 RX ORDER — OXYCODONE HYDROCHLORIDE 10 MG/1
5-10 TABLET ORAL EVERY 4 HOURS PRN
Qty: 25 TABLET | Refills: 0 | Status: SHIPPED | OUTPATIENT
Start: 2019-09-25 | End: 2019-09-25

## 2019-09-25 RX ORDER — DEXAMETHASONE SODIUM PHOSPHATE 4 MG/ML
INJECTION, SOLUTION INTRA-ARTICULAR; INTRALESIONAL; INTRAMUSCULAR; INTRAVENOUS; SOFT TISSUE PRN
Status: DISCONTINUED | OUTPATIENT
Start: 2019-09-25 | End: 2019-09-25

## 2019-09-25 RX ORDER — HYDROMORPHONE HYDROCHLORIDE 1 MG/ML
.3-.5 INJECTION, SOLUTION INTRAMUSCULAR; INTRAVENOUS; SUBCUTANEOUS EVERY 10 MIN PRN
Status: DISCONTINUED | OUTPATIENT
Start: 2019-09-25 | End: 2019-09-26 | Stop reason: HOSPADM

## 2019-09-25 RX ORDER — FENTANYL CITRATE 50 UG/ML
25-50 INJECTION, SOLUTION INTRAMUSCULAR; INTRAVENOUS
Status: DISCONTINUED | OUTPATIENT
Start: 2019-09-25 | End: 2019-09-26 | Stop reason: HOSPADM

## 2019-09-25 RX ORDER — AZITHROMYCIN 250 MG/1
250 TABLET, FILM COATED ORAL DAILY
Qty: 6 TABLET | Refills: 0 | Status: SHIPPED | OUTPATIENT
Start: 2019-09-25 | End: 2019-09-25

## 2019-09-25 RX ORDER — AZITHROMYCIN 250 MG/1
250 TABLET, FILM COATED ORAL DAILY
Qty: 6 TABLET | Refills: 0 | Status: SHIPPED | OUTPATIENT
Start: 2019-09-25 | End: 2021-03-31

## 2019-09-25 RX ORDER — ONDANSETRON 4 MG/1
4 TABLET, ORALLY DISINTEGRATING ORAL EVERY 30 MIN PRN
Status: DISCONTINUED | OUTPATIENT
Start: 2019-09-25 | End: 2019-09-26 | Stop reason: HOSPADM

## 2019-09-25 RX ORDER — ONDANSETRON 2 MG/ML
INJECTION INTRAMUSCULAR; INTRAVENOUS PRN
Status: DISCONTINUED | OUTPATIENT
Start: 2019-09-25 | End: 2019-09-25

## 2019-09-25 RX ORDER — ONDANSETRON 2 MG/ML
4 INJECTION INTRAMUSCULAR; INTRAVENOUS EVERY 30 MIN PRN
Status: DISCONTINUED | OUTPATIENT
Start: 2019-09-25 | End: 2019-09-26 | Stop reason: HOSPADM

## 2019-09-25 RX ORDER — FENTANYL CITRATE 50 UG/ML
INJECTION, SOLUTION INTRAMUSCULAR; INTRAVENOUS PRN
Status: DISCONTINUED | OUTPATIENT
Start: 2019-09-25 | End: 2019-09-25

## 2019-09-25 RX ORDER — CLINDAMYCIN PHOSPHATE 900 MG/50ML
900 INJECTION, SOLUTION INTRAVENOUS SEE ADMIN INSTRUCTIONS
Status: DISCONTINUED | OUTPATIENT
Start: 2019-09-25 | End: 2019-09-25 | Stop reason: HOSPADM

## 2019-09-25 RX ORDER — SODIUM CHLORIDE, SODIUM LACTATE, POTASSIUM CHLORIDE, CALCIUM CHLORIDE 600; 310; 30; 20 MG/100ML; MG/100ML; MG/100ML; MG/100ML
INJECTION, SOLUTION INTRAVENOUS CONTINUOUS PRN
Status: DISCONTINUED | OUTPATIENT
Start: 2019-09-25 | End: 2019-09-25

## 2019-09-25 RX ORDER — HYDROMORPHONE HYDROCHLORIDE 1 MG/ML
.3-.5 INJECTION, SOLUTION INTRAMUSCULAR; INTRAVENOUS; SUBCUTANEOUS EVERY 5 MIN PRN
Status: DISCONTINUED | OUTPATIENT
Start: 2019-09-25 | End: 2019-09-26 | Stop reason: HOSPADM

## 2019-09-25 RX ORDER — LIDOCAINE 40 MG/G
CREAM TOPICAL
Status: DISCONTINUED | OUTPATIENT
Start: 2019-09-25 | End: 2019-09-25 | Stop reason: HOSPADM

## 2019-09-25 RX ORDER — HYDROXYZINE HYDROCHLORIDE 25 MG/1
25 TABLET, FILM COATED ORAL 3 TIMES DAILY PRN
Qty: 20 TABLET | Refills: 1 | Status: SHIPPED | OUTPATIENT
Start: 2019-09-25 | End: 2021-03-31

## 2019-09-25 RX ORDER — CLINDAMYCIN PHOSPHATE 900 MG/50ML
900 INJECTION, SOLUTION INTRAVENOUS
Status: COMPLETED | OUTPATIENT
Start: 2019-09-25 | End: 2019-09-25

## 2019-09-25 RX ORDER — SODIUM CHLORIDE, SODIUM LACTATE, POTASSIUM CHLORIDE, CALCIUM CHLORIDE 600; 310; 30; 20 MG/100ML; MG/100ML; MG/100ML; MG/100ML
INJECTION, SOLUTION INTRAVENOUS CONTINUOUS
Status: DISCONTINUED | OUTPATIENT
Start: 2019-09-25 | End: 2019-09-26 | Stop reason: HOSPADM

## 2019-09-25 RX ORDER — NALOXONE HYDROCHLORIDE 0.4 MG/ML
.1-.4 INJECTION, SOLUTION INTRAMUSCULAR; INTRAVENOUS; SUBCUTANEOUS
Status: DISCONTINUED | OUTPATIENT
Start: 2019-09-25 | End: 2019-09-26 | Stop reason: HOSPADM

## 2019-09-25 RX ORDER — HYDROXYZINE HYDROCHLORIDE 25 MG/1
25 TABLET, FILM COATED ORAL 3 TIMES DAILY PRN
Qty: 20 TABLET | Refills: 1 | Status: SHIPPED | OUTPATIENT
Start: 2019-09-25 | End: 2019-09-25

## 2019-09-25 RX ORDER — ONDANSETRON 4 MG/1
4 TABLET, ORALLY DISINTEGRATING ORAL EVERY 8 HOURS PRN
Qty: 20 TABLET | Refills: 1 | Status: SHIPPED | OUTPATIENT
Start: 2019-09-25 | End: 2019-09-25

## 2019-09-25 RX ORDER — LIDOCAINE HYDROCHLORIDE 20 MG/ML
INJECTION, SOLUTION INFILTRATION; PERINEURAL PRN
Status: DISCONTINUED | OUTPATIENT
Start: 2019-09-25 | End: 2019-09-25

## 2019-09-25 RX ORDER — ONDANSETRON 4 MG/1
4 TABLET, ORALLY DISINTEGRATING ORAL EVERY 8 HOURS PRN
Qty: 20 TABLET | Refills: 1 | Status: SHIPPED | OUTPATIENT
Start: 2019-09-25 | End: 2021-03-31

## 2019-09-25 RX ORDER — MEPERIDINE HYDROCHLORIDE 25 MG/ML
12.5 INJECTION INTRAMUSCULAR; INTRAVENOUS; SUBCUTANEOUS
Status: DISCONTINUED | OUTPATIENT
Start: 2019-09-25 | End: 2019-09-26 | Stop reason: HOSPADM

## 2019-09-25 RX ORDER — BUPIVACAINE HYDROCHLORIDE 2.5 MG/ML
INJECTION, SOLUTION EPIDURAL; INFILTRATION; INTRACAUDAL PRN
Status: DISCONTINUED | OUTPATIENT
Start: 2019-09-25 | End: 2019-09-26 | Stop reason: HOSPADM

## 2019-09-25 RX ORDER — OXYCODONE AND ACETAMINOPHEN 5; 325 MG/1; MG/1
1 TABLET ORAL EVERY 4 HOURS PRN
Status: DISCONTINUED | OUTPATIENT
Start: 2019-09-25 | End: 2019-09-26 | Stop reason: HOSPADM

## 2019-09-25 RX ORDER — PROPOFOL 10 MG/ML
INJECTION, EMULSION INTRAVENOUS PRN
Status: DISCONTINUED | OUTPATIENT
Start: 2019-09-25 | End: 2019-09-25

## 2019-09-25 RX ORDER — OXYCODONE HYDROCHLORIDE 10 MG/1
5-10 TABLET ORAL EVERY 4 HOURS PRN
Qty: 25 TABLET | Refills: 0 | Status: SHIPPED | OUTPATIENT
Start: 2019-09-25 | End: 2021-03-31

## 2019-09-25 RX ORDER — SODIUM CHLORIDE, SODIUM LACTATE, POTASSIUM CHLORIDE, CALCIUM CHLORIDE 600; 310; 30; 20 MG/100ML; MG/100ML; MG/100ML; MG/100ML
INJECTION, SOLUTION INTRAVENOUS CONTINUOUS
Status: DISCONTINUED | OUTPATIENT
Start: 2019-09-25 | End: 2019-09-25 | Stop reason: HOSPADM

## 2019-09-25 RX ADMIN — ROCURONIUM BROMIDE 60 MG: 10 INJECTION INTRAVENOUS at 13:23

## 2019-09-25 RX ADMIN — PROPOFOL 200 MG: 10 INJECTION, EMULSION INTRAVENOUS at 13:23

## 2019-09-25 RX ADMIN — LIDOCAINE HYDROCHLORIDE 100 MG: 20 INJECTION, SOLUTION INFILTRATION; PERINEURAL at 13:23

## 2019-09-25 RX ADMIN — SODIUM CHLORIDE, POTASSIUM CHLORIDE, SODIUM LACTATE AND CALCIUM CHLORIDE: 600; 310; 30; 20 INJECTION, SOLUTION INTRAVENOUS at 16:50

## 2019-09-25 RX ADMIN — FENTANYL CITRATE 25 MCG: 50 INJECTION, SOLUTION INTRAMUSCULAR; INTRAVENOUS at 18:14

## 2019-09-25 RX ADMIN — MIDAZOLAM 2 MG: 1 INJECTION INTRAMUSCULAR; INTRAVENOUS at 13:14

## 2019-09-25 RX ADMIN — DEXAMETHASONE SODIUM PHOSPHATE 6 MG: 4 INJECTION, SOLUTION INTRAMUSCULAR; INTRAVENOUS at 13:32

## 2019-09-25 RX ADMIN — SUGAMMADEX 160 MG: 100 INJECTION, SOLUTION INTRAVENOUS at 18:04

## 2019-09-25 RX ADMIN — ONDANSETRON 4 MG: 2 INJECTION INTRAMUSCULAR; INTRAVENOUS at 18:58

## 2019-09-25 RX ADMIN — FENTANYL CITRATE 25 MCG: 50 INJECTION, SOLUTION INTRAMUSCULAR; INTRAVENOUS at 18:01

## 2019-09-25 RX ADMIN — FENTANYL CITRATE 50 MCG: 50 INJECTION, SOLUTION INTRAMUSCULAR; INTRAVENOUS at 13:23

## 2019-09-25 RX ADMIN — Medication: at 18:08

## 2019-09-25 RX ADMIN — HYDROMORPHONE HYDROCHLORIDE 0.5 MG: 1 INJECTION, SOLUTION INTRAMUSCULAR; INTRAVENOUS; SUBCUTANEOUS at 18:17

## 2019-09-25 RX ADMIN — CLINDAMYCIN PHOSPHATE 900 MG: 18 INJECTION, SOLUTION INTRAVENOUS at 13:29

## 2019-09-25 RX ADMIN — FENTANYL CITRATE 50 MCG: 50 INJECTION, SOLUTION INTRAMUSCULAR; INTRAVENOUS at 14:00

## 2019-09-25 RX ADMIN — SODIUM CHLORIDE, POTASSIUM CHLORIDE, SODIUM LACTATE AND CALCIUM CHLORIDE: 600; 310; 30; 20 INJECTION, SOLUTION INTRAVENOUS at 13:23

## 2019-09-25 RX ADMIN — FENTANYL CITRATE 50 MCG: 50 INJECTION, SOLUTION INTRAMUSCULAR; INTRAVENOUS at 15:12

## 2019-09-25 RX ADMIN — ONDANSETRON 4 MG: 2 INJECTION INTRAMUSCULAR; INTRAVENOUS at 17:50

## 2019-09-25 RX ADMIN — HYDROMORPHONE HYDROCHLORIDE 0.5 MG: 1 INJECTION, SOLUTION INTRAMUSCULAR; INTRAVENOUS; SUBCUTANEOUS at 17:49

## 2019-09-25 ASSESSMENT — MIFFLIN-ST. JEOR: SCORE: 1542.63

## 2019-09-25 NOTE — BRIEF OP NOTE
[facility] Brief Operative Note    Pre-operative diagnosis: Transgender/Gender Dysphoria   Post-operative diagnosis Transgender/Gender Dysphoria   Procedure: Procedure(s):  Bilateral Simple Mastectomy With NO Nipple Graft Reconstruction. OnQ   Surgeon(s): Surgeon(s) and Role:     * Rajwinder Lucas MD - Primary   Assistant: Matt Dougherty MS4   Estimated blood loss: 150 mL  IVFs: 1200 mls  UO: 500 mls    Specimens: ID Type Source Tests Collected by Time Destination   1 : left breast tissue for Biomet Tissue Breast, Left OR DOCUMENTATION ONLY Rajwinder Lucas MD 9/25/2019  2:35 PM    2 : Right Breast Tissue for Biomet Tissue Breast, Right OR DOCUMENTATION ONLY Rajwinder Lucas MD 9/25/2019  2:35 PM    A : left breast  Tissue Breast, Left SURGICAL PATHOLOGY EXAM Rajwinder Lucas MD 9/25/2019  3:00 PM    B : Right breast tissue Tissue Breast, Right SURGICAL PATHOLOGY EXAM Rajwinder Lucas MD 9/25/2019  3:02 PM       Findings: Skin sensitive to adhesives.   Left breast = 754 gms, right breast = 625 gms.

## 2019-09-25 NOTE — ANESTHESIA CARE TRANSFER NOTE
Patient: Mami Gotti    Procedure(s):  Bilateral Simple Mastectomy With NO Nipple Graft Reconstruction. OnQ    Diagnosis: Transgender/Gender Dysphoria  Diagnosis Additional Information: No value filed.    Anesthesia Type:   General     Note:  Airway :Face Mask  Patient transferred to:PACU  Comments: Regular respirations and patent airway. VSS. IV patent and infusing. Pt resting comfortably. Report given to RN  Handoff Report: Identifed the Patient, Identified the Reponsible Provider, Reviewed the pertinent medical history, Discussed the surgical course, Reviewed Intra-OP anesthesia mangement and issues during anesthesia, Set expectations for post-procedure period and Allowed opportunity for questions and acknowledgement of understanding      Vitals: (Last set prior to Anesthesia Care Transfer)    CRNA VITALS  9/25/2019 1740 - 9/25/2019 1820      9/25/2019             NIBP:  116/68    Ht Rate:  73    Temp:  36.5  C (97.7  F)    SpO2:  100 %    Resp Rate (observed):  14                Electronically Signed By: ALESSANDRA Chin CRNA  September 25, 2019  6:20 PM

## 2019-09-25 NOTE — ANESTHESIA PREPROCEDURE EVALUATION
Anesthesia Pre-Procedure Evaluation    Patient: Mami Gotti   MRN:     [id number] Gender:   adult   Age:    34 year old :      1985        Preoperative Diagnosis: Transgender/Gender Dysphoria   Procedure(s):  Bilateral Simple Mastectomy With NO Nipple Graft Reconstruction. OnQ     Past Medical History:   Diagnosis Date     Bulimia nervosa     in [place] program     Eczema       History reviewed. No pertinent surgical history.       Anesthesia Evaluation     .             ROS/MED HX    ENT/Pulmonary:       Neurologic:  - neg neurologic ROS     Cardiovascular:  - neg cardiovascular ROS       METS/Exercise Tolerance:  >4 METS   Hematologic:         Musculoskeletal:         GI/Hepatic:  - neg GI/hepatic ROS       Renal/Genitourinary:  - ROS Renal section negative       Endo:  - neg endo ROS       Psychiatric:  - neg psychiatric ROS       Infectious Disease:  - neg infectious disease ROS       Malignancy:      - no malignancy   Other:                         PHYSICAL EXAM:   Mental Status/Neuro: A/A/O   Airway: Facies: Feasible  Mallampati: I  Mouth/Opening: Full  TM distance: > 6 cm  Neck ROM: Full   Respiratory: Auscultation: CTAB     Resp. Rate: Normal     Resp. Effort: Normal      CV: Rhythm: Regular  Rate: Age appropriate  Heart: Normal Sounds  Edema: None   Comments:      Dental: Normal Dentition                LABS:  CBC:   Lab Results   Component Value Date    WBC 4.0 08/10/2011    HGB 12.5 2019    HGB 13.7 08/10/2011    HCT 42.3 2019    HCT 40.9 08/10/2011     08/10/2011     BMP:   Lab Results   Component Value Date    .9 2019    POTASSIUM 3.2 (L) 2019    CHLORIDE 106.1 2019    CO2 24.8 2019    BUN 13.0 2019    CR 0.8 2019    GLC 61.9 (L) 2019    GLC 90 2014     COAGS: No results found for: PTT, INR, FIBR  POC: No results found for: BGM, HCG, HCGS  OTHER:   Lab Results   Component Value Date    AGGIE 8.7 2019     "    Preop Vitals    BP Readings from Last 3 Encounters:   09/25/19 94/71   09/16/19 129/67   04/02/19 119/76    Pulse Readings from Last 3 Encounters:   09/25/19 64   09/16/19 57   04/02/19 80      Resp Readings from Last 3 Encounters:   09/25/19 16   09/16/19 16   06/17/16 18    SpO2 Readings from Last 3 Encounters:   09/25/19 97%   09/16/19 99%   04/02/19 98%      Temp Readings from Last 1 Encounters:   09/25/19 36.9  C (98.4  F) (Oral)    Ht Readings from Last 1 Encounters:   09/25/19 1.702 m (5' 7\")      Wt Readings from Last 1 Encounters:   09/25/19 81 kg (178 lb 9.2 oz)    Estimated body mass index is 27.97 kg/m  as calculated from the following:    Height as of this encounter: 1.702 m (5' 7\").    Weight as of this encounter: 81 kg (178 lb 9.2 oz).     LDA:        Assessment:   ASA SCORE: 1    H&P: History and physical reviewed and following examination; no interval change.    NPO Status: NPO Appropriate     Plan:   Anes. Type:  General   Pre-Medication: None   Induction:  IV (Standard)   Airway: ETT; Oral   Access/Monitoring: PIV   Maintenance: Balanced     Postop Plan:   Postop Pain: NSAID  Postop Sedation/Airway: Not planned     PONV Management: Adult Risk Factors: Female     CONSENT: Direct conversation   Plan and risks discussed with: Patient   Blood Products: Consent Deferred (Minimal Blood Loss)       Comments for Plan/Consent:  GA with ETT  Risks versus benefits discussed. All questions answered                 Carlitos Tovar MD  "

## 2019-09-26 NOTE — DISCHARGE INSTRUCTIONS
Caring for Your Johnathan-Uribe Drain    You have been discharged with a Johnathan-Uribe drainage tube. This tube drains fluid from your incision, helping prevent swelling and reducing the risk for infection. The tube is held in place by a few stitches. The drain will be removed when your doctor determines you no longer need it and when the amount of drainage decreases. The color and amount of fluid varies. Right after surgery, the fluid may be bright red and may become clearer over time.   Dressing:    Keep the skin around the tube dry.    If you have a dressing, change it every day.   o Wash your hands.  o Remove the old bandage. Do not use scissors-you may accidentally cut the tube.  o Check for any redness, swelling, drainage, or broken stitches. (Call your doctor with any of these findings).  o Wash your hands again.  o Wet a cotton swab (Q-tip) and clean around the incision and the tube site. Use normal saline solution (salt and water) or soap and water. Start at the tube site and move outward in a circular motion.   o Pat dry.  o Put a new bandage on the incision and tube site. Make the bandage large enough to cover the whole incision area.   o Tape the bandage in place.  o Throw out old materials and wash your hands.   Home Care:    Tape the tube to the skin below the bandage. Make sure to keep some slack in the tube to keep it from pulling out.     DO NOT sleep on the same side as the tube.    Secure the tube and bulb inside your clothing with a safety pin. This helps keep the tube from being pulled out.     Keep the bulb compressed at all times, except when you empty it.    Empty your drain at least twice a day. Empty it more often if the drain is full.   o Lift the opening of the drain.  o Drain the fluid into a measuring cup.  o Record the amount of fluid each time you empty. Share the information with your doctor at your follow-up visit.   o Squeeze the bulb with your hands until you hear air coming out of  the bulb.  o Close the opening.     Tape plastic wrap over the bandage and tube site when you shower.      Stripping  the tube helps keep blood clots from blocking the tube.                         ONLY DO THIS IF YOUR MD INSTRUCTED YOU TO DO SO!  o Hold the tubing where it leaves the skin with one hand. This keeps it from pulling on the skin.  o Pinch the tubing with the thumb and first finger of your other hand.   o Slowly and firmly pull your thumb and first finger down the tube (squeezing the tube between your fingers). Keep squeezing the tube as you run your fingers towards the bulb. If the pulling hurts or feels like it is coming out of the skin, STOP. Begin again more gently.  o Let go of the tubing with both hands. If the tube is still blocked, repeat these steps three or four times. Make sure that the bulb is compressed so it creates suction.  When to call your doctor:    New or increased pain around the tube    Redness, warmth, or swelling around the incision or tube    Drainage that is foul smelling    Vomiting    Fever over 101 F degrees    Fluid leaking around the tube    Incision seems not to be healing    Stitches become loose    The tube falls out    Drainage that changes from light pick to dark red    A sudden increase or decrease in the amount of drainage (over 30 ml).  Your drainage record:  Date Time Bulb 1: Amount of drainage (ml or cc) Bulb 2: Amount of drainage (ml or cc) Notes                                                                                            Rev. 4/2014  Same-Day Surgery   Adult Discharge Orders & Instructions     For 24 hours after surgery:  1. Get plenty of rest.  A responsible adult must stay with you for at least 24 hours after you leave the hospital.   2. Pain medication can slow your reflexes. Do not drive or use heavy equipment.  If you have weakness or tingling, don't drive or use heavy equipment until this feeling goes away.  3. Mixing alcohol and pain  medication can cause dizziness and slow your breathing. It can even be fatal. Do not drink alcohol while taking pain medication.  4. Avoid strenuous or risky activities.  Ask for help when climbing stairs.   5. You may feel lightheaded.  If so, sit for a few minutes before standing.  Have someone help you get up.   6. If you have nausea (feel sick to your stomach), drink only clear liquids such as apple juice, ginger ale, broth or 7-Up.  Rest may also help.  Be sure to drink enough fluids.  Move to a regular diet as you feel able. Take pain medications with a small amount of solid food, such as toast or crackers, to avoid nausea.   7. A slight fever is normal. Call the doctor if your fever is over 100 F (37.7 C) (taken under the tongue) or lasts longer than 24 hours.  8. You may have a dry mouth, muscle aches, trouble sleeping or a sore throat.  These symptoms should go away after 24 hours.  9. Do not make important or legal decisions.   Pain Management:      1. Take pain medication (if prescribed) for pain as directed by your physician.        2. WARNING: If the pain medication you have been prescribed contains Tylenol  (acetaminophen), DO NOT take additional doses of Tylenol (acetaminophen).     Call your doctor for any of the followin.  Signs of infection (fever, growing tenderness at the surgery site, severe pain, a large amount of drainage or bleeding, foul-smelling drainage, redness, swelling).    2.  It has been over 8 to 10 hours since surgery and you are still not able to urinate (pee).    3.  Headache for over 24 hours.    4.  Numbness, tingling or weakness the day after surgery (if you had spinal anesthesia).  To contact a doctor, call _____________________________________ or:      [phone] and ask for the Resident On Call for:          __________________________________________ (answered 24 hours a day)      Emergency Department:  [place] [facility]: [phone]  [place]  [facility]: [phone]                  Caring for Your Incision    You ll need to care for your incision after surgery and certain medical procedures. To close an incision, your doctor used sutures (stitches), steri-strips, staples, or dermabond. Follow the tips on this sheet to help heal and prevent infection of your incision.   Types of Incision Closure:    Surgical Sutures (stitches) are placed by sewing the edges of an incision together with surgical thread. Sutures are either absorbable or non-absorbable. Absorbable sutures break down in the body over time. Non-absorbable sutures need to be removed.     Steri-strips are made of adhesive (sticky) material to help hold the edges of an incision together. Steri-strips usually fall off by themselves in 7 to 10 days.     Surgical Staples are made or steel or titanium. They are often used to close shallow incisions. They are not used on certain body areas, such as the face and hands. This is because these areas have nerves that are close to the surface. Staples are usually removed within a week.     Dermabond (skin glue) is used to close a cut or small incision. The skin glue is less painful than stitches (sutures). In some cases, a lower layer of skin may be sutured before Dermabond is applied. The skin glue closes the cut/incision within a few minutes. It also provides a water-resistant covering. No bandage is required. Dermabond peels off on its own within 5 to 10 days.  Home Care for Your  Incision:    Keep the incision clean and dry. You should bathe only as directed by the doctor. It is okay to wash around the incision, but don t spray water directly on it.     Check the incision site daily for pain, redness, drainage, swelling, or separation of the incision edges.     If you have a dressing over the incision, change the dressing as directed by the doctor.    Make sure any clothing that touches the incision is loose fitting. This will prevent  rubbing. If the incision is on the head, avoid wearing caps or other head coverings. These may rub against the incision.    Avoid rough play, contact sports, or physical activities. This can put you at risk of opening an incision.     As your incision heals, the skin may appear pink or red. It may also feel slightly bumpy or raised. This is called a healing ridge. Over time, the color should fade and the raised skin will become less noticeable.   Call the doctor right away if you have any of the following:    Increased pain, redness, drainage, swelling or bleeding at the incision site    Numbness, coldness or tingling around the incision site    Fever of 101 F degrees or higher  Rev. 4/2014  ON-Q  C-bloc Continuous Nerve Block Discharge Instructions  The Nerve Block: {UR ON Q Blocks:910944}      Your anesthesiologist performed a nerve block (a procedure that blocks pain to only a specific area) by inserting a small tube in your body.  This tube is connected to a pump that will help control your pain.    The pump is shaped like a balloon and is filled with medicine that causes numbness or loss of sensation to help control your pain around the incision or site of injury.  The pain pump DOES NOT contain controlled substances.      The medicine in the pump may alter your ability to feel changes in temperature or pressure. Your doctor will tell you if any special precautions apply to you.       The Pump      DO NOT SQUEEZE THE PUMP.     The pump delivers medicine at a very slow rate.    You will NOT see the medicine moving through the tubing.    As the medicine is delivered, the pump ball will slowly become smaller.    It may take a day or so before you notice a change in the size and look of the pump.    Depending on the size of your pump, it may take 2 - 5 days to give all the medicine.    The middle part of the pump may look like an apple core when empty.  Managing Your Pain  The Medicine and Infusion Rate:   {onq  medications:568825} {onq med dosing rate:076184}            The continuous nerve block infusion may not block all of the pain from your surgery so it is important that you take the pain medicines prescribed by your surgeon if you need them.      If you continue to have difficulty with your pain control, please page or call the anesthesiologist.  Caring for Your Pump at Home    Wear the pump on the outside of clothing - away from your skin and cold therapy (ice packs).  The delivery rate is accurate only at room temperature.    Make sure the white clamp on the tubing remains open (moves freely on the tubing).    Make sure there are no kinks in the tubing.    Do not tape or cover up the filter.    Protect the pump from sunlight and heat.    When sleeping:   o Do not place the pump underneath the bed covers where the pump may become too warm.  o Do not place the pump on the floor or hang the pump on a bed post as these situations may cause the tubing to get tangled and get pulled out.    Bathing/Showering:    o We recommend taking sponge baths until the pump is removed.  o It is important to not get the area where the tube enters your body wet.    It is normal for a small amount of fluid to leak from the hole in your body where the tube is inserted.  If this occurs, reinforce the bandage with another bandage.  The Infusion    Do not turn the infusion off unless your anesthesiologist has told you to do so.    Do not change the flow rate of the infusion unless your anesthesiologist told you to do so.  Changing the flow rate without your doctor s instruction may result in the wrong dose of medicine, which could cause serious injury.    If your anesthesiologist told you to change the rate of your infusion:  o Flip open the clear cover.  o Turn the white key on the select-a-flow dial to the instructed rate.  (The rate of the infusion should line up with the black arrow at the top of the controller.)    o Listen for the  click when you move the dial.  Removing the Tubing    When the pump is empty or if you have been told to stop the infusion you can remove the tubing.  Follow these steps:  o Wash your hands.  o Clamp the tubing (squeeze the white clamp until you hear or feel a click).  o Remove the clear dressing that covers the tubing.  o Grasp the tubing close to the skin and gently pull.  If you meet resistance, stop pulling and page or call your anesthesiologist.  o DO NOT cut or forcefully remove the tubing.  o After removal, check the end of the tubing for a dark tip.  If you do not see a dark tip, page or call your anesthesiologist.  o Apply firm pressure over the site until oozing stops.  Wash the area with soap and water, dry with a clean towel and then cover with a bandage.      The pump is not reusable. Dispose of it in the trash and wash your hands.   Troubleshooting    Tubing Comes Out From Skin:  If the tube accidentally comes out, check the end of the tube for a dark tip.    If you see a dark tip simply discard it and use the pain pills prescribed to you by your surgeon.    If you don t see a dark tip, page or call the anesthesiologist.    Tubing Disconnection:  If the tubing accidentally becomes disconnected from the pump, DO NOT reconnect the pump to the tubing.  It may have been contaminated with germs.  Close the tubing clamp and immediately page or call your anesthesiologist.    Fluid Leaking:  If enough fluid is leaking from the pump or the tubing outside your body to soak through the dressing, close the white clamp on the tubing and page or call your anesthesiologist.    Immediately report the following to your anesthesiologist:    Redness, warmth, swelling, or tenderness at the site the tubing was inserted    Increase in pain    Fever, chills, sweats    Bowel or bladder changes    Difficulty breathing    Dizziness, lightheadedness    Blurred vision    Ringing or buzzing in your ears    Metal taste in your  mouth    Numbness and/or tingling around your mouth, fingers or toes    Drowsiness    Confusion    Trouble removing the tubing    Dark tip is not present when tubing is removed         Notifying your Anesthesiologist  o Page:  Dial [phone], then enter [phone].  You will be prompted to enter your phone number and then the # sign.  The anesthesiologist will call you back.  o Call:  Dial [phone].  Ask to speak to the anesthesiologist on call for the [facility].

## 2019-09-26 NOTE — ANESTHESIA POSTPROCEDURE EVALUATION
Anesthesia POST Procedure Evaluation    Patient: Mami Gotti   MRN:     [id number] Gender:   adult   Age:    34 year old :      1985        Preoperative Diagnosis: Transgender/Gender Dysphoria   Procedure(s):  Bilateral Simple Mastectomy With NO Nipple Graft Reconstruction. OnQ   Postop Comments: No value filed.       Anesthesia Type:  Not documented  General    Reportable Event: NO     PAIN: Uncomplicated   Sign Out status: Comfortable, Well controlled pain     PONV: No PONV   Sign Out status:  No Nausea or Vomiting     Neuro/Psych: Uneventful perioperative course   Sign Out Status: Preoperative baseline; Age appropriate mentation     Airway/Resp.: Uneventful perioperative course   Sign Out Status: Non labored breathing, age appropriate RR; Resp. Status within EXPECTED Parameters     CV: Uneventful perioperative course   Sign Out status: Appropriate BP and perfusion indices; Appropriate HR/Rhythm     Disposition:   Sign Out in:  PACU  Disposition:  Phase II; Home  Recovery Course: Uneventful  Follow-Up: Not required     Comments/Narrative:  Patient doing well post-operatively.  No significant issues.  Hemodynamically stable, pain well controlled, nausea well controlled.  Stable for discharge from the PACU             Last Anesthesia Record Vitals:  CRNA VITALS  2019 1740 - 2019 1840      2019             NIBP:  116/68    Ht Rate:  73    Temp:  36.5  C (97.7  F)    SpO2:  100 %    Resp Rate (observed):  14          Last PACU Vitals:  Vitals Value Taken Time   /84 2019  7:30 PM   Temp 36.7  C (98.1  F) 2019  7:00 PM   Pulse 67 2019  7:30 PM   Resp 11 2019  7:30 PM   SpO2 100 % 2019  7:30 PM   Temp src     NIBP 116/68 2019  6:16 PM   Pulse     SpO2 100 % 2019  6:16 PM   Resp     Temp 36.5  C (97.7  F) 2019  6:16 PM   Ht Rate 73 2019  6:16 PM   Temp 2     Vitals shown include unvalidated device data.      Electronically Signed By:  Shaan Madsen MD, September 25, 2019, 9:20 PM

## 2019-09-27 NOTE — OP NOTE
Procedure Date: 09/25/2019      ATTENDING SURGEON:  Rajwinder Lucas MD      ASSISTANT:  Matt Dougherty MS4      PREOPERATIVE DIAGNOSIS:  Gender dysphoria.      POSTOPERATIVE DIAGNOSIS:  Gender dysphoria.      PROCEDURE:  Bilateral simple mastectomies without nipple grafts.  On-Q catheter placement.      ANESTHESIA:  GET.      ESTIMATED BLOOD LOSS:  150 mL.      IV FLUIDS:  1200 mL.      URINE OUTPUT:  500 mL.      COUNTS:  Correct.      COMPLICATIONS:  None.      DRAINS:  STEFF x 2.        TOTAL TISSUE REMOVED:   Right breast 625 grams, left breast 754 grams.      INDICATIONS:  This is a 34-year-old biological female who is nonbinary and met WPATH and insurance criteria for top surgery for gender affirmation.  Due to this patient's breast ptosis and volume, they would require double incisions.  They were not interested in having their nipples reconstructed.      DESCRIPTION OF PROCEDURE:  The patient was seen in the preoperative waiting area.  The operative site included sternal notch, sternal midline, inframammary folds with extensions for posterior dog ears, and vertical line through the nipple-areolar complex.  We also transposed the IMF onto the anterior breast and also marked transverse line from the mid humeral level.  Informed consent was obtained after reviewing the possible risks and complications, including but not limited to the following:  Infection, bleeding, hematoma seroma formation, poor healing, possible dehiscence, possible splitting sutures, possible hypertrophic scarring, altered sensation of the chest wall, including hypo or hypersensitivity, possible injury surrounding neurovascular and musculoskeletal structure as well as intrathoracic and intraaxillary structures, asymmetries and residual deformities, need for further surgery, and anesthetic risks such as DVT, PE and cardiopulmonary arrest.  The patient was then brought to the operating room, placed supine on the OR table.  After  general anesthesia was administered, a Phelps was placed.  The patient already had sequential compression devices on her lower extremities prior to induction.  Arms were secured to arm boards with Ace wraps.  Additional padding with pillows was placed under the legs and thighs and forelegs, and these were then secured with padded safety straps.  The patient was put into a sitting position to make adjustments for symmetry.  The chest breast area was then prepped and draped in the usual sterile fashion using ChloraPrep.  A timeout was taken, and proper patient and procedure were identified.  The IMF incisions were then made sharply with the [employer] PEAK PlasmaBlade on the right and scalpel and frintit cautery on the left.  Approximately 2 cm of subcutaneous fat were left along the IMF incision before beveling down to the pectoralis fascia.  This then allowed us to undermine the breast mound at the level of the pectoral fascia.  We undermined up towards the clavicle, medially towards the parasternal border and laterally past the lateral border of the pectoralis major muscle.  This allowed us to pull the breast mound inferiorly and jose where it overlapped with the IMF incision.  This was very close to our transposed IMF incision or markings.  The breast mounds were then amputated.  Some additional thinning of the flaps was done to create a flat chest contour.  Of note, this patient had somewhat prominent ribs inferiorly and also more of a rounded-appearing central chest.  Once we had amputated the mounds, those were passed off the backtable and weighed before sending to pathology for definitive histologic examination.  Once we were happy with our contouring, we skin stapled our incisions and put the patient into a sitting position.  This allowed us to make markings to gain better symmetry with regards to the contour of her incision as well as to eliminate dog ears both medially and laterally.  Medially, it was  obvious that we would need to join our incisions in the midline to avoid any distortion of the contour there.  We trimmed these extra areas and once again put the patient in a sitting position to make sure that we were happy with our symmetry, both in terms of incisional shape and level and flap contour.  We did end up going a bit higher on both sides than our original IMF incision, in order to achieve our most aesthetically pleasing result.  When we were done removing all the extra bits, the dissection pocket was then irrigated with triple antibiotic solution.  Hemostasis was achieved with cautery.  Dual 10-inch On-Q catheters were percutaneously introduced from the epigastric region and draped along the superior aspect of the dissection pocket, and #15 round STEFF drains were introduced through separate stab wounds laterally and secured with 3-0 nylon suture.  These were draped along the inferior aspect of the pocket.  Definitive closure was then achieved with 3-0 Vicryl deep dermal buried sutures followed by 4-0 Vicryl running subcuticular suture and some tightening sutures for the skin with 5-0 fast absorbing gut.  JPs were trimmed and put to bulb suction.  Exofin Dermabond product was applied to the incisions.  ABD pads were used for drain sponges, and Kerlix rolls were used to pad the anterior chest before wrapping circumferentially with a double-long 6-inch Ace.  The patient was extubated, transferred to a stretcher, Phelps was removed, and the patient was taken to the recovery room in satisfactory condition, having tolerated the procedure without difficulty or complication.  Of note, Tissue samples are measured and weighed in the OR and sent to pathology included the right breast 625 grams and the left breast 754 grams.         AVI GE MD             D: 2019   T: 2019   MT: ANGELA      Name:     LESIA GREER   MRN:      [id number]        Account:        [id number]   :       1985           Procedure Date: 09/25/2019      Document: [id number]

## 2019-10-01 ENCOUNTER — OFFICE VISIT (OUTPATIENT)
Dept: PLASTIC SURGERY | Facility: CLINIC | Age: 34
End: 2019-10-01
Payer: COMMERCIAL

## 2019-10-01 DIAGNOSIS — Z90.13 S/P BILATERAL MASTECTOMY: Primary | ICD-10-CM

## 2019-10-01 ASSESSMENT — PAIN SCALES - GENERAL: PAINLEVEL: MILD PAIN (2)

## 2019-10-01 NOTE — LETTER
"10/1/2019       RE: Mami Gotti  [address]  [facility] [address]     Dear Colleague,    Thank you for referring your patient, Mami Gotti, to the [facility] PLASTIC AND RECONSTRUCTIVE SURGERY at [facility]. Please see a copy of my visit note below.    PLASTICS POST-OP   This is a 34 year nonbinary individual with a history of gender dysphoria who presents 6 days post-op after a bilateral simple mastectomy without nipple graft reconstruction on 9/25/2019. They are here today with their partner.     Overall, Mami's post op experience has gone well. They took oxycodone at night to help with sleeping. They report the OnQ did help with pain management. Had some nausea immediately post-op, but no reported emetic events. No significant pruritis. They have had some mild constipation, but bowels are moving slowly on Metamucil. The ACE wrap has not been re-wrapped since the surgery and they have not showered. They do not need any medication refills today.    PE: Chest: Great upper chest contour and symmetry. Nipples are absent. Prineo intact. Some pressure \"rash\" noted above ACE wrap in axillary regions and below the ACE wrap. Mild bruising noted along right lateral incision. No standing cutaneous deformities. No reaction to Tegaderm. Photo taken with verbal consent.    A&P: 34 year nonbinary individual with a history of gender dysphoria who presents 6 days post-op after a bilateral simple mastectomy without nipple graft reconstruction on 9/25/2019.     As the left STEFF drain output was within normal limits, left STEFF drain was removed without difficulty. They should return to clinic in 2 days to have rright STEFF removed by an RN. Also removed OnQ catheters today. We discussed when to remove the Prineo on their incision and I mentioned Mami can shower. They should wear their ACE wrap for an additional week.     I reviewed with the patient how long to maintain " limited activities. We discussed scar reducing techniques, such as Mederma, silicone strips, vitamin E oil, emu oil, massage and when he may begin using these. Problems to look out for during the recovery period were discussed, including: spitting sutures, incision dehiscence, hematoma/seroma, thick scarring, fluid accumulation under skin flap.     They will follow up in 2 days to have right STEFF drain removed, and then should return to see me in clinic 2 months post-op. Causes for returning sooner were discussed.     This note was prepared on behalf of Rajwinder Lucas MD, by Ainsley Bella, a trained medical scribe, based on my observations and the provider's statements to me.     Again, thank you for allowing me to participate in the care of your patient.      Sincerely,    Rajwinder Lucas MD

## 2019-10-01 NOTE — PROGRESS NOTES
"PLASTICS POST-OP   This is a 34 year nonbinary individual with a history of gender dysphoria who presents 6 days post-op after a bilateral simple mastectomy without nipple graft reconstruction on 9/25/2019. They are here today with their partner.     Overall, Mami's post op experience has gone well. They took oxycodone at night to help with sleeping. They report the OnQ did help with pain management. Had some nausea immediately post-op, but no reported emetic events. No significant pruritis. They have had some mild constipation, but bowels are moving slowly on Metamucil. The ACE wrap has not been re-wrapped since the surgery and they have not showered. They do not need any medication refills today.    PE: Chest: Great upper chest contour and symmetry. Nipples are absent. Prineo intact. Some pressure \"rash\" noted above ACE wrap in axillary regions and below the ACE wrap. Mild bruising noted along right lateral incision. No standing cutaneous deformities. No reaction to Tegaderm. Photo taken with verbal consent.    A&P: 34 year nonbinary individual with a history of gender dysphoria who presents 6 days post-op after a bilateral simple mastectomy without nipple graft reconstruction on 9/25/2019.     As the left STEFF drain output was within normal limits, left TSEFF drain was removed without difficulty. They should return to clinic in 2 days to have rright STEFF removed by an RN. Also removed OnQ catheters today. We discussed when to remove the Prineo on their incision and I mentioned Mami can shower. They should wear their ACE wrap for an additional week.     I reviewed with the patient how long to maintain limited activities. We discussed scar reducing techniques, such as Mederma, silicone strips, vitamin E oil, emu oil, massage and when he may begin using these. Problems to look out for during the recovery period were discussed, including: spitting sutures, incision dehiscence, hematoma/seroma, thick scarring, fluid " accumulation under skin flap.     They will follow up in 2 days to have right STEFF drain removed, and then should return to see me in clinic 2 months post-op. Causes for returning sooner were discussed.     This note was prepared on behalf of Rajwinder Lucas MD, by Ainsley Bella, a trained medical scribe, based on my observations and the provider's statements to me.

## 2019-10-01 NOTE — NURSING NOTE
Chief Complaint   Patient presents with     Post-Op - General Surgery     Pt here for post op        There were no vitals filed for this visit.    There is no height or weight on file to calculate BMI.      JENAE Smart NREMT                    No vitals taken per provider

## 2019-10-03 ENCOUNTER — ALLIED HEALTH/NURSE VISIT (OUTPATIENT)
Dept: SURGERY | Facility: CLINIC | Age: 34
End: 2019-10-03
Payer: COMMERCIAL

## 2019-10-03 DIAGNOSIS — Z98.890 POSTOPERATIVE STATE: Primary | ICD-10-CM

## 2019-10-03 NOTE — PROGRESS NOTES
Pt. comes into clinic today at the request of Dr. Rajwinder Lucas.    This service provided today was under the supervising provider of the GOKUL Kurtz, who was available if needed.    Reason for visit: Post op visit.  Pt returns one week after bilateral mastectomy with free nipple grafts.      Incisions:  Well approximated, no drainage, no redness  Pain:  Denies- using Tylenol occasionally  Drains: Bilateral STEFF drains < 5 ml for 2 days. Right STEFF drain removed.  Instructions:  See AVS  Return to clinic:  See Dr. Lucas in 8 weeks    Lore Ho, KIM  Care Coordinator

## 2019-10-04 ENCOUNTER — NURSE TRIAGE (OUTPATIENT)
Dept: NURSING | Facility: CLINIC | Age: 34
End: 2019-10-04

## 2019-10-04 NOTE — TELEPHONE ENCOUNTER
[facility]: Nurse Triage Note  SITUATION/BACKGROUND                                                      Mami Gotti is a 34 year old adult who calls with  constipation.  Bilateral Simple Mastectomy With NO Nipple Graft Reconstruction.  Interventions:   1.Metamucil in advance of surgery and  2. started osmotic laxative( Miralax) 36 hours ago.  3.She has taken 2 doses of off brand Miralax  Symptoms: Passed gas, somewhat, she had small infrequent BM since surgery.  This current constipation has been present x 42 hours.  Nausea or vomiting negative, no raymond abdominal pain, does feel bloated.  No longer taking Oxycodone and all other surgical issues are doing great.  No rectal bleeding. No history of constipation or diverticulitis  Eating and drinking normally.  Plan: see care advice.    Allergies:   Allergies   Allergen Reactions     Amoxicillin Hives       Guideline used: constipation  Telephone Triage Protocols for Nurses, Fifth Edition, Calli Delgado, RN, RN    Additional Information    Negative: Abdomen pain is the main symptom and adult male    Negative: Abdomen pain is the main symptom and adult female    Negative: Rectal bleeding or blood in stool is the main symptom    Negative: Patient sounds very sick or weak to the triager    Negative: Vomiting bile (green color)    Negative: Constant abdominal pain lasting > 2 hours    Negative: Vomiting and abdomen looks much more swollen than usual    Negative: Rectal pain or fullness from fecal impaction (rectum full of stool) and NOT better after SITZ bath, suppository or enema    Negative: Abdomen is more swollen than usual    Negative: Last bowel movement (BM) > 4 days ago    Negative: Leaking stool    Negative: Intermittent mild abdominal pain and fever    Mild constipation    Protocols used: CONSTIPATION-A-[place]

## 2019-10-05 LAB — COPATH REPORT: NORMAL

## 2019-10-17 ENCOUNTER — DOCUMENTATION ONLY (OUTPATIENT)
Dept: SURGERY | Facility: CLINIC | Age: 34
End: 2019-10-17

## 2019-10-17 ENCOUNTER — TELEPHONE (OUTPATIENT)
Dept: SURGERY | Facility: CLINIC | Age: 34
End: 2019-10-17

## 2019-10-17 NOTE — TELEPHONE ENCOUNTER
10/17/2019, 4:49 PM    Spoke with patient regarding FMLA paperwork, confirmed back to work date 11/21/2019.    JOANIE GardinerT

## 2019-10-22 ENCOUNTER — DOCUMENTATION ONLY (OUTPATIENT)
Dept: SURGERY | Facility: CLINIC | Age: 34
End: 2019-10-22

## 2019-10-22 NOTE — PROGRESS NOTES
This patient's FMLA paperwork was faxed to [facility] at fax number [phone]. Incident #[id number]  Patient's FMLA is from 09/25/2019 to 11/20/2019.  Surgeon: Dr. Lucas.   A copy was also scanned into the patient's chart.     12:48 PM, 10/22/2019  Hever Rodriguez, EMT

## 2019-11-08 ENCOUNTER — HEALTH MAINTENANCE LETTER (OUTPATIENT)
Age: 34
End: 2019-11-08

## 2019-11-26 ENCOUNTER — OFFICE VISIT (OUTPATIENT)
Dept: PLASTIC SURGERY | Facility: CLINIC | Age: 34
End: 2019-11-26
Payer: COMMERCIAL

## 2019-11-26 DIAGNOSIS — Z90.13 S/P BILATERAL MASTECTOMY: Primary | ICD-10-CM

## 2019-11-26 ASSESSMENT — PAIN SCALES - GENERAL: PAINLEVEL: NO PAIN (0)

## 2019-11-26 NOTE — LETTER
"11/26/2019       RE: Mami Gotti  [address]  [facility] [address]     Dear Colleague,    Thank you for referring your patient, Mami Gotti, to the [facility] PLASTIC AND RECONSTRUCTIVE SURGERY at [facility]. Please see a copy of my visit note below.    PLASTICS FOLLOW UP    This 34 year old non-binary individual is here for their 2 month follow up s/p bilateral simple mastectomy without nipple grafts 9/25/19.  They started work last week as  at [employer] and seemed to do fine.   oother than some slight pulling sensation in the central chest area when getting out of bed, they have minimal discomfort and rare \"zingers\".   They are OK with their results and only had a mild concern about some \"thickness\" noted below the central incision.   They have been massaging their scars with BioOil  every day-BID, as well as silicone tape.    On exam, they have awesome contour and symmetry. There is some fullness below the joined midline incision that is most likely adipose tissue that is mildly distorted due to the incision above and the natural skin crease below. I do not think this is fluid.   The scars are only mildly hypertrophic, except where things are flat around the corner. There are no dogear deformities. Photos were taken with verbal consent    We will have Mami return for their 1 year jose after surgery unless there are new developments or concerns.        Again, thank you for allowing me to participate in the care of your patient.      Sincerely,    Rajwinder Lucas MD      "

## 2019-11-26 NOTE — NURSING NOTE
Chief Complaint   Patient presents with     Surgical Followup     Pt here for 8 week post op       There were no vitals filed for this visit.    There is no height or weight on file to calculate BMI.      JENAE Smart NREMT                    No vitals taken per provider

## 2019-11-27 NOTE — PROGRESS NOTES
"PLASTICS FOLLOW UP    This 34 year old non-binary individual is here for their 2 month follow up s/p bilateral simple mastectomy without nipple grafts 9/25/19.  They started work last week as  at [employer] and seemed to do fine.   oother than some slight pulling sensation in the central chest area when getting out of bed, they have minimal discomfort and rare \"zingers\".   They are OK with their results and only had a mild concern about some \"thickness\" noted below the central incision.   They have been massaging their scars with BioOil  every day-BID, as well as silicone tape.    On exam, they have awesome contour and symmetry. There is some fullness below the joined midline incision that is most likely adipose tissue that is mildly distorted due to the incision above and the natural skin crease below. I do not think this is fluid.   The scars are only mildly hypertrophic, except where things are flat around the corner. There are no dogear deformities. Photos were taken with verbal consent    We will have Mami return for their 1 year jose after surgery unless there are new developments or concerns.   "

## 2020-12-06 ENCOUNTER — HEALTH MAINTENANCE LETTER (OUTPATIENT)
Age: 35
End: 2020-12-06

## 2021-01-21 ENCOUNTER — OFFICE VISIT (OUTPATIENT)
Dept: FAMILY MEDICINE | Facility: CLINIC | Age: 36
End: 2021-01-21
Payer: COMMERCIAL

## 2021-01-21 VITALS
BODY MASS INDEX: 27.22 KG/M2 | RESPIRATION RATE: 18 BRPM | HEIGHT: 68 IN | OXYGEN SATURATION: 97 % | HEART RATE: 72 BPM | SYSTOLIC BLOOD PRESSURE: 120 MMHG | DIASTOLIC BLOOD PRESSURE: 75 MMHG | TEMPERATURE: 98.6 F

## 2021-01-21 DIAGNOSIS — R42 VERTIGO: Primary | ICD-10-CM

## 2021-01-21 DIAGNOSIS — Z23 NEED FOR PROPHYLACTIC VACCINATION AND INOCULATION AGAINST INFLUENZA: ICD-10-CM

## 2021-01-21 DIAGNOSIS — Z86.69 HISTORY OF MIGRAINE WITH AURA: ICD-10-CM

## 2021-01-21 PROCEDURE — 90686 IIV4 VACC NO PRSV 0.5 ML IM: CPT | Performed by: STUDENT IN AN ORGANIZED HEALTH CARE EDUCATION/TRAINING PROGRAM

## 2021-01-21 PROCEDURE — 99214 OFFICE O/P EST MOD 30 MIN: CPT | Mod: 25 | Performed by: STUDENT IN AN ORGANIZED HEALTH CARE EDUCATION/TRAINING PROGRAM

## 2021-01-21 PROCEDURE — 90471 IMMUNIZATION ADMIN: CPT | Performed by: STUDENT IN AN ORGANIZED HEALTH CARE EDUCATION/TRAINING PROGRAM

## 2021-01-21 RX ORDER — MECLIZINE HYDROCHLORIDE 25 MG/1
25 TABLET ORAL 3 TIMES DAILY PRN
Qty: 8 TABLET | Refills: 0 | Status: SHIPPED | OUTPATIENT
Start: 2021-01-21 | End: 2021-04-03

## 2021-01-21 NOTE — PROGRESS NOTES
"  Assessment & Plan     Vertigo  History of migraine with aura  Pt with history of migraine with aura since childhood presents with 4 episodes of dizziness/vertigo in the past few weeks. Episodes last 15-30 minutes. Never had similar symptoms in the past, no associated headache with these symptoms. Not brought on by position changes, pt typically sitting down when they start. Does have associated light sensitivity when this happens and feels \"woozy.\" A few of the episodes have been so severe they are unable to hold head up or walk without support. No recent congestion/URI symptoms, no tinnitus, no hearing or vision change, no aura. On exam, normal gait and romberg, negative juan hallpike, small serous effusion behind R TM. Considered BPPV, vestibular migraine, vestibular neuritis, meniere's disease. At this point, differential is likely BPPV > meniere's disease > vestibular migraine. Discussed symptomatic tx for now with meclizine, hydration, and attention to factors that bring symptoms about; pt will return to clinic if no improvement or increasingly frequent episodes. Consider Physical therapy for BPPV treatment vs if red flag symptoms present, pursue brain imaging. Could try salt restriction for possible meniere's disease.   - meclizine (ANTIVERT) 25 MG tablet; Take 1 tablet (25 mg) by mouth 3 times daily as needed for dizziness  - return to clinic if continued or worsening symptoms, consider PT referral for BPPV      Return in about 4 weeks (around 2/18/2021), or if symptoms worsen or fail to improve.  I was present with the medical student who participated in the service and in the documentation of this note. I have verified the history and personally performed the physical exam and medical decision making, and have verified the content of the note, which accurately reflects my assessment of the patient and the plan of care.   Rishabh Houston MD     [facility]    Subjective " "    Mami is a 35 year old who presents to clinic today for the following health issues    HPI   Pt has a history of migraines since childhood. Frequency is anywhere between once/year to once/month. Most recent migraine was a few months ago. They have always had light sensitivity/aura with migraines. Usually sleeps them off, doesn't take medications. Never had dizziness in the past. In mid January started to have episodes of dizziness/vertigo \"spins\". The first episode, the pt noticed dizziness at work that passed after about 30 minutes (still able to do work tasks). Thought maybe migraine was coming but never developed headache. This was not their typical migraine aura. Next it happened after dinner after a long shift , was sitting down, looking at phone and all the sudden room spinning, unable to walk without assistance - lasted 15 mins and they eventually fell asleep. Never developed migraine. Had a few more episodes in the following week (~4 total), a few were severe room-spinning vertigo, a few just mild dizziness they were able to work through. With all these episodes had associated light sensitivity.    No syncopal episodes. No vomiting, though feels nauseated with the vertigo. No ringing in ears. No aural fullness. No numbness/tingling in face or extremities. No vision changes. No congestion, sinus pain or URI symptoms.      Review of Systems   Constitutional, HEENT, cardiovascular, pulmonary, gi and gu systems are negative, except as otherwise noted.      Objective    /75 (BP Location: Left arm, Patient Position: Sitting, Cuff Size: Adult Regular)   Pulse 72   Temp 98.6  F (37  C) (Oral)   Resp 18   Ht 1.725 m (5' 7.91\")   SpO2 97%   Breastfeeding No   BMI 27.22 kg/m    Body mass index is 27.22 kg/m .  Physical Exam   GENERAL: healthy, alert and no distress  EYES: EOM intact, PERRL, no nystagmus on juan hallpike  EARS: small serous effusion behind R TM, no bulging or erythema, L canal and TM " appear normal with a small amount of wax    NECK: no adenopathy, no asymmetry, masses, or scars  RESP: lungs clear to auscultation - no rales, rhonchi or wheezes  CV: regular rate and rhythm, normal S1 S2, no S3 or S4, no murmur, click or rub, no peripheral edema   ABDOMEN: soft, nontender, no hepatosplenomegaly, no masses   MS: no gross musculoskeletal defects noted, no edema  NEURO: Normal strength and tone, mentation intact and speech normal, negative Romberg, normal gait, negative juan-hallpike     Ayanna Bach, MS3

## 2021-01-21 NOTE — PROGRESS NOTES
Preceptor Attestation:   Patient seen, evaluated and discussed with the resident. I have verified the content of the note, which accurately reflects my assessment of the patient and the plan of care.   Supervising Physician:  Shaan Foote MD

## 2021-01-21 NOTE — PATIENT INSTRUCTIONS
Patient Education     Vertigo (Unknown Cause)  Drink at least 60 ounces of water daily. Could also try OTC flonase and OTC allegra   Try this medicine, antivert, and if you're not getting better, you need to come back. This could be vestibular migraines vs BPPV (would need physical therapy). Less likely something scary in brain but if things aren't better, you need to be evaluated.     In addition to helping with hearing, the inner ear is part of the balance center of your body. Problems with the inner ear can a false feeling of motion. This is called vertigo. Often, it feels as if you or the room is spinning. A vertigo attack may cause sudden nausea, vomiting and heavy sweating. Severe vertigo causes a loss of balance and can cause you to fall. During vertigo, small head movements and changes in body position will often make the symptoms worse. You may also have ringing in the ears called tinnitus.  An episode of vertigo may last seconds, minutes or hours. Once you are over the first episode, it may never come back. However, symptoms may return off and on.  The cause of your vertigo is not yet known. Possible causes of vertigo include:    Inflammation of the inner ear    Disease of the nerves to the inner ear    Movement of calcium particles in the inner ear    Poor blood flow to the balance centers of the brain    Migraine headaches    In older adults, the use of more than one medicine along with some health conditions  Home care    If symptoms are severe, rest quietly in bed. Change positions very slowly. There is usually one position that will feel best, such as lying on one side or lying on your back with your head slightly raised on pillows. Until you have no symptoms, you are at a higher risk of falling. Let someone help you when you get up. Get rid of home hazards such as loose electrical cords and throw rugs. Don t walk in unfamiliar areas that are not lighted. Use night lights in bathrooms and kitchen  areas.    Do not drive a car or work with dangerous machinery until symptoms have been gone for at least one week.    Take medicine as prescribed to relieve your symptoms. Unless another medicine was prescribed for symptoms of nausea, vomiting, and dizziness, you may use over-the-counter motion sickness pills. Ask your pharmacist for suggestions.    Follow-up care  Follow up with your healthcare provider or as directed. If you are referred to a specialist or for testing, make the appointment promptly.  When to seek medical advice  Call your healthcare provider if any of the following occur:    Fever of 100.4 F (38 C) or higher, or as directed by your healthcare provider    Vertigo worsens or is not controlled by prescribed medicine     Repeated vomiting not relieved by prescribed medicine     Severe headache    Confusion    Weakness of an arm or leg or 1 side of the face    Difficulty with speech or vision    Loss of consciousness     Seizure  [employer] last reviewed this educational content on 11/1/2017 2000-2020 [employer]. [address] [place] [address], [place] [address]. All rights reserved. This information is not intended as a substitute for professional medical care. Always follow your healthcare professional's instructions.

## 2021-01-24 PROBLEM — Z86.69 HISTORY OF MIGRAINE WITH AURA: Status: ACTIVE | Noted: 2021-01-24

## 2021-04-05 ENCOUNTER — OFFICE VISIT (OUTPATIENT)
Dept: FAMILY MEDICINE | Facility: CLINIC | Age: 36
End: 2021-04-05
Payer: COMMERCIAL

## 2021-04-05 VITALS
TEMPERATURE: 98.1 F | HEART RATE: 82 BPM | BODY MASS INDEX: 27.7 KG/M2 | HEIGHT: 67 IN | OXYGEN SATURATION: 99 % | SYSTOLIC BLOOD PRESSURE: 106 MMHG | DIASTOLIC BLOOD PRESSURE: 69 MMHG

## 2021-04-05 DIAGNOSIS — F64.9 GENDER DYSPHORIA: Primary | ICD-10-CM

## 2021-04-05 DIAGNOSIS — F50.89 OTHER DISORDER OF EATING: ICD-10-CM

## 2021-04-05 PROCEDURE — 99214 OFFICE O/P EST MOD 30 MIN: CPT | Mod: GC | Performed by: STUDENT IN AN ORGANIZED HEALTH CARE EDUCATION/TRAINING PROGRAM

## 2021-04-05 NOTE — PROGRESS NOTES
Preceptor Attestation:    I discussed the patient with the resident and evaluated the patient in person. I have verified the content of the note, which accurately reflects my assessment of the patient and the plan of care.   Supervising Physician:  Margie Jo MD.

## 2021-04-05 NOTE — PATIENT INSTRUCTIONS
Here is the plan from today's visit    1. Gender dysphoria  Think over low dose T vs seasons of T and E predominance  Come back and go over informed consent    Please call or return to clinic if your symptoms don't go away.        Thank you for coming to [facility] today.  Lab Testing:  **If you had lab testing today and your results are reassuring or normal they will be mailed to you or sent through [employer] within 7 days.   **If the lab tests need quick action we will call you with the results.  The phone number we will call with results is # [phone] (home) . If this is not the best number please call our clinic and change the number.  Medication Refills:  If you need any refills please call your pharmacy and they will contact us.   If you need to  your refill at a new pharmacy, please contact the new pharmacy directly. The new pharmacy will help you get your medications transferred faster.   Scheduling:  If you have any concerns about today's visit or wish to schedule another appointment please call our office during normal business hours [phone] (8-5:00 M-F)   eferrals to [facility] please call [phone].   Mammogram Scheduling [phone]     XRay/CT/Ultrasound/MRI Scheduling [phone]    Medical Concerns:  If you have urgent medical concerns please call [phone] at any time of the day.    Meet Hayden,

## 2021-04-05 NOTE — PROGRESS NOTES
"    Assessment & Plan     Gender dysphoria  Other eating disorder  Patient has had history of gender dysphoria that drives eating disorder symptoms. They want to start HRT to have increased energy and redistribution of adiposity. Top surgery performed 2019. They ideally would like to try a \"low dose\" testosterone regimen. Discussed side effect profile (including chance of baldness, clitoromegaly, and voice chances).  Currently have an IUD placed in 2016 for menstrual suppression and they do not wish to have children. Discussed how testosterone can suppress ovulation.Patient will consider whether or not they want to start HRT and return to clinic in 1-2 months.   --Labs: ALT, AST, Glucose, Hg, lipids     Eczema:   Has had chronic eczema since childhood that worsens in winter. Uses topical creams (eucerin). Patient feels it is well-controled with OTC therapy.     FUTURE APPOINTMENTS:       - Follow-up visit in 1-2 months or as patient wishes to discuss consent for HRT.     Damaris Lane, MS4    Resident Attestation:  I was present with the medical student who participated in the service and in the documentation of this note. I have verified the history and personally performed the physical exam and medical decision making. I have verified the content of the note, which accurately reflects my assessment of the patient and the plan of care.  Resident Physician: DO [facility] PGY-3      Chris Bolaños is a 35 year old who presents for the following health issues:  HRT     HPI     Patient came to clinic to discuss starting HRT (testosterone). They have been considering HRT for the past 6 years and now wish to begin therapy. They had bilateral mastectomy in 2019. Has not ever tried HRT in the past. States their main goal of therapy would be to have increased energy and redistribution of adiposity. One of the reasons they did not start HRT sooner is because they feared hair loss and " "baldness, however, it is now something they are neutral about. Overall they do not wish to fully transition and instead want to remain on a lower dose as nonbinary.     Have been in a long term relationship for the past six years. Partner's name is Kelsi (she/her, cisgender has uterus). Sexually active with one partner. Mami has IUD that was placed in 2016. They have no desire to have children. LMP was approximately 4 years ago (IUD suppresses their menstruation).     Mental health: Mami has seen the same therapist for the past several years. They have had history of eating disorder (diagnosed as bulimia though it manifests as binge cravings and not actual purging). Has thoughts of binge eating multiple times a week, though has not had actual binge in over 4 months. Mami's eating habits are influenced significantly by gender dysphoria; their gender dysphoria is driving many aspects of eating disorder. No hx of depression, SI or SH.      Chem hx: former smoker (smoked 1 pack per day from 1359-7520, has not smoked since 2016). Had hx ETOH use disorder, sober > 6 years. No history of stimulant use, marijuana, or other elicit substances. Does not drink caffeine.     Social hx: lives in [place] with partner Kelsi. Works at [employer] in [place].     Aside from history of migraine headache with aura (since childhood) patient has no other medical conditions.       Review of Systems   Constitutional, HEENT, cardiovascular, pulmonary, gi and gu systems are negative, except as otherwise noted.      Objective    /69   Pulse 82   Temp 98.1  F (36.7  C) (Oral)   Ht 1.71 m (5' 7.32\")   SpO2 99%   BMI 27.70 kg/m    Body mass index is 27.7 kg/m .  Physical Exam   GENERAL: healthy, alert and no distress  NECK: no adenopathy, no asymmetry, masses, or scars and thyroid normal to palpation  RESP: lungs clear to auscultation - no rales, rhonchi or wheezes  CV: regular rate and rhythm, normal S1 S2, no S3 or S4, no murmur, " click or rub, no peripheral edema and peripheral pulses strong  ABDOMEN: soft, nontender, no hepatosplenomegaly, no masses and bowel sounds normal  MS: no gross musculoskeletal defects noted, no edema

## 2021-04-07 DIAGNOSIS — F64.9 GENDER DYSPHORIA: ICD-10-CM

## 2021-04-07 LAB
ALT SERPL-CCNC: 11.8 U/L (ref 0–45)
AST SERPL-CCNC: 20.6 U/L (ref 0–45)
CHOLEST SERPL-MCNC: 144.4 MG/DL (ref 0–200)
CHOLEST/HDLC SERPL: 2.8 {RATIO} (ref 0–5)
GLUCOSE CASUAL: 67 MG/DL (ref 51–200)
HDLC SERPL-MCNC: 50.8 MG/DL
HEMOGLOBIN: 13.5 G/DL (ref 11.7–15.7)
LDLC SERPL CALC-MCNC: 82 MG/DL (ref 0–129)
TRIGL SERPL-MCNC: 56.9 MG/DL (ref 0–150)
VLDL CHOLESTEROL: 11.4 MG/DL (ref 7–32)

## 2021-04-07 PROCEDURE — 84450 TRANSFERASE (AST) (SGOT): CPT

## 2021-04-07 PROCEDURE — 82947 ASSAY GLUCOSE BLOOD QUANT: CPT

## 2021-04-07 PROCEDURE — 80061 LIPID PANEL: CPT

## 2021-04-07 PROCEDURE — 85018 HEMOGLOBIN: CPT

## 2021-04-07 PROCEDURE — 84460 ALANINE AMINO (ALT) (SGPT): CPT

## 2021-04-07 PROCEDURE — 36415 COLL VENOUS BLD VENIPUNCTURE: CPT

## 2021-04-09 ENCOUNTER — OFFICE VISIT (OUTPATIENT)
Dept: FAMILY MEDICINE | Facility: CLINIC | Age: 36
End: 2021-04-09
Payer: COMMERCIAL

## 2021-04-09 VITALS
OXYGEN SATURATION: 98 % | HEART RATE: 73 BPM | DIASTOLIC BLOOD PRESSURE: 74 MMHG | SYSTOLIC BLOOD PRESSURE: 121 MMHG | HEIGHT: 68 IN | TEMPERATURE: 98 F | BODY MASS INDEX: 27.56 KG/M2

## 2021-04-09 DIAGNOSIS — Z00.00 PREVENTATIVE HEALTH CARE: ICD-10-CM

## 2021-04-09 DIAGNOSIS — F64.9 GENDER DYSPHORIA: Primary | ICD-10-CM

## 2021-04-09 DIAGNOSIS — N95.2 VAGINAL ATROPHY: ICD-10-CM

## 2021-04-09 PROCEDURE — 99214 OFFICE O/P EST MOD 30 MIN: CPT | Mod: GC | Performed by: STUDENT IN AN ORGANIZED HEALTH CARE EDUCATION/TRAINING PROGRAM

## 2021-04-09 RX ORDER — ESTRADIOL 0.1 MG/G
2 CREAM VAGINAL
Qty: 42.5 G | Refills: 1 | Status: SHIPPED | OUTPATIENT
Start: 2021-04-12

## 2021-04-09 RX ORDER — TESTOSTERONE 1.62 MG/G
1 GEL TRANSDERMAL DAILY
Qty: 75 G | Refills: 1 | Status: SHIPPED | OUTPATIENT
Start: 2021-04-09 | End: 2021-07-31

## 2021-04-09 NOTE — PROGRESS NOTES
Preceptor Attestation:  Patient seen and evaluated in person. I discussed the patient with the resident. I have verified the content of the note, which accurately reflects my assessment of the patient and the plan of care.   Supervising Physician:  Poonam Barr DO

## 2021-04-09 NOTE — PATIENT INSTRUCTIONS
Here is the plan from today's visit    1. Gender dysphoria  Start low dose T today - 1 pump on the arms once per day after a shower, let dry for 2-4 h before anyone can contact your arm.  Ok to get vaginal estrogen cream to use to treat/or prevent vaginal atrophy on T. Use twice a week. I sent it to the pharmacy too. Use it every day 1-2 weeks before your pap appointment.  - testosterone (ANDROGEL 1.62 % PUMP) 20.25 MG/ACT gel; Place 1 Pump onto the skin daily Apply from dispenser to clean, dry, intact skin of the upper arms and shoulders.  Dispense: 75 g; Refill: 1      Please call or return to clinic if your symptoms don't go away.    Follow up plan  Return in about 4 weeks (around 5/7/2021) for 1-2 months for hormone follow up and pap, in person, with me.     Thank you for coming to [facility] today.  Lab Testing:  **If you had lab testing today and your results are reassuring or normal they will be mailed to you or sent through [employer] within 7 days.   **If the lab tests need quick action we will call you with the results.  The phone number we will call with results is # [phone] (home) . If this is not the best number please call our clinic and change the number.  Medication Refills:  If you need any refills please call your pharmacy and they will contact us.   If you need to  your refill at a new pharmacy, please contact the new pharmacy directly. The new pharmacy will help you get your medications transferred faster.   Scheduling:  If you have any concerns about today's visit or wish to schedule another appointment please call our office during normal business hours [phone] (8-5:00 M-F)   eferrals to [facility] please call [phone].   Mammogram Scheduling [phone]     XRay/CT/Ultrasound/MRI Scheduling [phone]    Medical Concerns:  If you have urgent medical concerns please call [phone] at any time of the day.    Meet Hayden, DO    If unable to  schedule an appointment with your doctor, schedule with someone else on their clinic team.

## 2021-04-09 NOTE — PROGRESS NOTES
"    Assessment & Plan     Gender dysphoria  Agreed to trial of low dose testosterone. Reviewed admin methods, pt chooses androgel. Recommended to maximize bone health in meantime by continuing to not smoke, get good dietary Ca/Vit D and consider supplementation, regular aerobic and weight-bearing exercise.  Informed consent signed today and scanned into medical chart - see media tab.  Spent an additional 5-10 minutes answering patient's questions about treatment of AFAB transgender patients who are binary and nonbinary with testosterone, hysterectomies, etc.  - testosterone (ANDROGEL 1.62 % PUMP) 20.25 MG/ACT gel  Dispense: 75 g; Refill: 1    Vaginal atrophy  Will use estradiol topical 2-3x weekly to prevent vaginal atrophy. Will use daily for 1-2 weeks prior to pap.  - estradiol (ESTRACE) 0.1 MG/GM vaginal cream  Dispense: 42.5 g; Refill: 1    Preventative healthcare  Reviewed need for pap screening. Will plan on doing soon before chance for much vaginal atrophy. Pt does not need pre-pap ativan.      Return in about 4 weeks (around 5/7/2021) for 1-2 months for hormone follow up and pap, in person, with me.    [facility] Resident PGY-3      Chris Malhotra is a 35 year old who presents for the following health issues   HPI       Gender dysphoria - nonbinary, wants androgynous appearance. Consider low dose T trial and optimize bone health vs alternating T/off T q 3 months.  Informed consent today  Able to read over materials from last time. Discussed bone health recs.  No questions.  Would like to do low dose T trial.  No smoking.  Also would like to prevent vaginal atrophy. Ok with clitoromegaly.    Review of Systems         Objective    /74   Pulse 73   Temp 98  F (36.7  C) (Oral)   Ht 1.715 m (5' 7.5\")   SpO2 98%   BMI 27.56 kg/m    Body mass index is 27.56 kg/m .  Physical Exam   General: Alert and oriented, in no acute distress.   Skin: Warm and dry, no abnormalities " Rama Jean is a 13 month old female who was brought in for her Well Child visit. History was provided by caregiver  HPI:   Patient presents for:  Well Child    Past Medical History  History reviewed. No pertinent past medical history.     Past Surgi bilaterally, hearing is grossly intact  Nose/Mouth/Throat:  nose and throat are clear, palate is intact, mucous membranes are moist, no oral lesions are noted  Neck/Thyroid:  neck is supple without adenopathy  Breast:  normal on inspection without masses noted.   Eyes: Extra-ocular muscles intact, pupils equal and reactive.   ENT: Speech intact, nasal passages open, no hearing impairment noted.   CV: No cyanosis or pallor, warm and well perfused.   Respiratory: No respiratory distress, no accessory muscle use.   Neuro: Gait and station normal, comprehension intact. Gross and fine motor skills intact.   Psychiatric: Mood and affect appear normal.   Extremities: Warm, able to move all four extremities at will.     Orders Only on 04/07/2021   Component Date Value Ref Range Status     Cholesterol 04/07/2021 144.4  0.0 - 200.0 mg/dL Final     Cholesterol/HDL Ratio 04/07/2021 2.8  0.0 - 5.0 Final     HDL Cholesterol 04/07/2021 50.8  >40.0 mg/dL Final     Triglycerides 04/07/2021 56.9  0.0 - 150.0 mg/dL Final     VLDL Cholesterol 04/07/2021 11.4  7.0 - 32.0 mg/dL Final     LDL Cholesterol Calculated 04/07/2021 82  0 - 129 mg/dL Final     Hemoglobin 04/07/2021 13.5  11.7 - 15.7 g/dL Final     Glucose Casual 04/07/2021 67.0  51.0 - 200.0 mg/dL Final     AST 04/07/2021 20.6  0.0 - 45.0 U/L Final     ALT 04/07/2021 11.8  0.0 - 45.0 U/L Final        addressed  Feeding, development and activity discussed  Anticipatory guidance for age reviewed.   Reji Developmental Handout provided      Vision screening done and reviewed, no risk factors identified, normal by Go Check KIDs screening  Device  and by ex

## 2021-05-12 ENCOUNTER — OFFICE VISIT (OUTPATIENT)
Dept: FAMILY MEDICINE | Facility: CLINIC | Age: 36
End: 2021-05-12
Payer: COMMERCIAL

## 2021-05-12 VITALS
DIASTOLIC BLOOD PRESSURE: 72 MMHG | HEIGHT: 68 IN | BODY MASS INDEX: 31.89 KG/M2 | SYSTOLIC BLOOD PRESSURE: 119 MMHG | TEMPERATURE: 98.4 F | WEIGHT: 210.4 LBS | OXYGEN SATURATION: 99 % | HEART RATE: 68 BPM

## 2021-05-12 DIAGNOSIS — Z13.9 SCREENING FOR CONDITION: ICD-10-CM

## 2021-05-12 DIAGNOSIS — Z12.4 CERVICAL CANCER SCREENING: Primary | ICD-10-CM

## 2021-05-12 DIAGNOSIS — Z80.3 FAMILY HISTORY OF MALIGNANT NEOPLASM OF BREAST: ICD-10-CM

## 2021-05-12 DIAGNOSIS — F64.9 GENDER DYSPHORIA: ICD-10-CM

## 2021-05-12 PROCEDURE — G0145 SCR C/V CYTO,THINLAYER,RESCR: HCPCS | Performed by: FAMILY MEDICINE

## 2021-05-12 PROCEDURE — 87491 CHLMYD TRACH DNA AMP PROBE: CPT | Performed by: FAMILY MEDICINE

## 2021-05-12 PROCEDURE — 87591 N.GONORRHOEAE DNA AMP PROB: CPT | Performed by: FAMILY MEDICINE

## 2021-05-12 PROCEDURE — 99214 OFFICE O/P EST MOD 30 MIN: CPT | Mod: GC | Performed by: STUDENT IN AN ORGANIZED HEALTH CARE EDUCATION/TRAINING PROGRAM

## 2021-05-12 PROCEDURE — 87624 HPV HI-RISK TYP POOLED RSLT: CPT | Performed by: FAMILY MEDICINE

## 2021-05-12 ASSESSMENT — MIFFLIN-ST. JEOR: SCORE: 1690.25

## 2021-05-12 NOTE — PROGRESS NOTES
"    Assessment & Plan     Cervical cancer screening  Screening for condition    - NEISSERIA GONORRHOEA PCR  - CHLAMYDIA TRACHOMATIS PCR  - Pap imaged thin layer screen with HPV - recommended age 30 - 65 years (select HPV order below)  - HPV High Risk Types DNA Cervical    Gender dysphoria  Family history of malignant neoplasm of susi  Doing well 1 month on low dose testosterone. Answered questions about longterm cancer screenings: cervical, breast. Had maternal grandmother with breast cancer onset age 63. No specific WPATH recommendations for routine breast cancer screening. Discussed that since they are s/p top surgery, presurgical mammo was BIRADS1, and they have 2nd degree relative without early onset breast cancer, would not screen unless patient notices a change in their chest -> then options would likely be ultrasound and/or MRI. Problem list updated.      [facility] Resident PGY-3      Chris Malhotra is a 35 year old who presents for the following health issues     HPI     1 month on low dose testosterone - hasn't noticed any changes in how they feel, voice, hair, acne, energy, sleep, appetite.    Here also for pap smear. No vaginal discharge, abnormal bleeding, discomfort. No h/o abnormal pap. No new sexual partners - current partner doesn't produce sperm. Interested in routine asymptomatic STI screening.  Review of Systems         Objective    /72   Pulse 68   Temp 98.4  F (36.9  C) (Oral)   Ht 1.715 m (5' 7.52\")   Wt 95.4 kg (210 lb 6.4 oz)   SpO2 99%   BMI 32.45 kg/m    Body mass index is 32.45 kg/m .  Physical Exam   GENERAL: healthy, alert and no distress  NECK: no adenopathy, no asymmetry, masses, or scars and thyroid normal to palpation  RESP: lungs clear to auscultation - no rales, rhonchi or wheezes  CV: regular rate and rhythm, normal S1 S2, no S3 or S4, no murmur, click or rub, no peripheral edema and peripheral pulses strong  ABDOMEN: soft, " nontender, no hepatosplenomegaly, no masses and bowel sounds normal   : normal vulva, normal urethral meatus, vaginal mucosa, normal cervix/adnexa/uterus without masses or discharge  MS: no gross musculoskeletal defects noted, no edema

## 2021-05-12 NOTE — PATIENT INSTRUCTIONS
Consider at [facility]: [email or web address]  Here: Dr Landen Butcher and Dr Hayden    Here is the plan from today's visit    1. Cervical cancer screening    - Pap imaged thin layer screen with HPV - recommended age 30 - 65 years (select HPV order below)  - HPV High Risk Types DNA Cervical      Please call or return to clinic if your symptoms don't go away.    Follow up plan  Return in about 2 months (around 7/12/2021).     Thank you for coming to [facility] today.  Lab Testing:  **If you had lab testing today and your results are reassuring or normal they will be mailed to you or sent through [facility] within 7 days.   **If the lab tests need quick action we will call you with the results.  The phone number we will call with results is # [phone] (home) . If this is not the best number please call our clinic and change the number.  Medication Refills:  If you need any refills please call your pharmacy and they will contact us.   If you need to  your refill at a new pharmacy, please contact the new pharmacy directly. The new pharmacy will help you get your medications transferred faster.   Scheduling:  If you have any concerns about today's visit or wish to schedule another appointment please call our office during normal business hours [phone] (8-5:00 M-F)   eferrals to [facility] please call [phone].   Mammogram Scheduling [phone]     XRay/CT/Ultrasound/MRI Scheduling [phone]    Medical Concerns:  If you have urgent medical concerns please call [phone] at any time of the day.    Meet Hayden, DO    If unable to schedule an appointment with your doctor, schedule with someone else on their clinic team.

## 2021-05-13 LAB
C TRACH DNA SPEC QL NAA+PROBE: NEGATIVE
N GONORRHOEA DNA SPEC QL NAA+PROBE: NEGATIVE
SPECIMEN SOURCE: NORMAL
SPECIMEN SOURCE: NORMAL

## 2021-05-14 PROBLEM — Z80.3 FAMILY HISTORY OF MALIGNANT NEOPLASM OF BREAST: Status: ACTIVE | Noted: 2021-05-14

## 2021-05-14 LAB
COPATH REPORT: NORMAL
PAP: NORMAL

## 2021-05-17 LAB
FINAL DIAGNOSIS: NORMAL
HPV HR 12 DNA CVX QL NAA+PROBE: NEGATIVE
HPV16 DNA SPEC QL NAA+PROBE: NEGATIVE
HPV18 DNA SPEC QL NAA+PROBE: NEGATIVE
SPECIMEN DESCRIPTION: NORMAL
SPECIMEN SOURCE CVX/VAG CYTO: NORMAL

## 2021-05-18 NOTE — PROGRESS NOTES
Preceptor Attestation:   Patient seen, evaluated and discussed with the resident. I have verified the content of the note, which accurately reflects my assessment of the patient and the plan of care.   Supervising Physician:  Ryland Freire MD

## 2021-07-13 ENCOUNTER — OFFICE VISIT (OUTPATIENT)
Dept: FAMILY MEDICINE | Facility: CLINIC | Age: 36
End: 2021-07-13
Payer: COMMERCIAL

## 2021-07-13 VITALS
DIASTOLIC BLOOD PRESSURE: 72 MMHG | SYSTOLIC BLOOD PRESSURE: 114 MMHG | RESPIRATION RATE: 16 BRPM | TEMPERATURE: 97.9 F | HEART RATE: 53 BPM | OXYGEN SATURATION: 99 %

## 2021-07-13 DIAGNOSIS — Z79.890 HORMONE REPLACEMENT THERAPY: Primary | ICD-10-CM

## 2021-07-13 LAB
BASOPHILS # BLD AUTO: 0 10E3/UL (ref 0–0.2)
BASOPHILS NFR BLD AUTO: 1 %
EOSINOPHIL # BLD AUTO: 0.2 10E3/UL (ref 0–0.7)
EOSINOPHIL NFR BLD AUTO: 4 %
ERYTHROCYTE [DISTWIDTH] IN BLOOD BY AUTOMATED COUNT: 12.6 % (ref 10–15)
HCT VFR BLD AUTO: 46.1 %
HGB BLD-MCNC: 14.6 G/DL
IMM GRANULOCYTES # BLD: 0 10E3/UL
IMM GRANULOCYTES NFR BLD: 0 %
LYMPHOCYTES # BLD AUTO: 1.5 10E3/UL (ref 0.8–5.3)
LYMPHOCYTES NFR BLD AUTO: 35 %
MCH RBC QN AUTO: 29.2 PG (ref 26.5–33)
MCHC RBC AUTO-ENTMCNC: 31.7 G/DL (ref 31.5–36.5)
MCV RBC AUTO: 92 FL (ref 78–100)
MONOCYTES # BLD AUTO: 0.3 10E3/UL (ref 0–1.3)
MONOCYTES NFR BLD AUTO: 8 %
NEUTROPHILS # BLD AUTO: 2.2 10E3/UL (ref 1.6–8.3)
NEUTROPHILS NFR BLD AUTO: 53 %
PLATELET # BLD AUTO: 294 10E3/UL (ref 150–450)
RBC # BLD AUTO: 5 10E6/UL
WBC # BLD AUTO: 4.2 10E3/UL (ref 4–11)

## 2021-07-13 PROCEDURE — 80053 COMPREHEN METABOLIC PANEL: CPT | Performed by: STUDENT IN AN ORGANIZED HEALTH CARE EDUCATION/TRAINING PROGRAM

## 2021-07-13 PROCEDURE — 36415 COLL VENOUS BLD VENIPUNCTURE: CPT | Performed by: STUDENT IN AN ORGANIZED HEALTH CARE EDUCATION/TRAINING PROGRAM

## 2021-07-13 PROCEDURE — 84403 ASSAY OF TOTAL TESTOSTERONE: CPT | Performed by: STUDENT IN AN ORGANIZED HEALTH CARE EDUCATION/TRAINING PROGRAM

## 2021-07-13 PROCEDURE — 99203 OFFICE O/P NEW LOW 30 MIN: CPT | Mod: GC | Performed by: STUDENT IN AN ORGANIZED HEALTH CARE EDUCATION/TRAINING PROGRAM

## 2021-07-13 PROCEDURE — 80061 LIPID PANEL: CPT | Performed by: STUDENT IN AN ORGANIZED HEALTH CARE EDUCATION/TRAINING PROGRAM

## 2021-07-13 PROCEDURE — 85025 COMPLETE CBC W/AUTO DIFF WBC: CPT | Performed by: STUDENT IN AN ORGANIZED HEALTH CARE EDUCATION/TRAINING PROGRAM

## 2021-07-13 NOTE — PROGRESS NOTES
TONIE Malhotra is a 35 year old individual who uses they/them pronouns that presents today for 3 month follow up since starting low-dose testosterone with androgel. They have not noticed any changes in how they feel, voice changes, acne, energy, sleep, appetite. Has had chronic eczema since childhood, and initially noticed some skin irritation with androgel, that has since stabilized. Discussed that application of androgel can be relocated to different parts of the body that are low likelihood of transfer.     Patient is still uncertain about specific changes they are looking for, and whether they want to commit to an increased dose of testosterone. Would like to try 2 pumps for some time, and see how they feel.    Patient notes that they have not had periods since IUD placement 7 years ago. Inquiring about requiring IUD replacement, primarily to avoid menstruation, less concerned about requiring contraception, though not completely out of the picture.     Past Surgical History:   Procedure Laterality Date     TRANSGENDER MASTECTOMY Bilateral 9/25/2019    Procedure: Bilateral Simple Mastectomy With NO Nipple Graft Reconstruction. OnQ;  Surgeon: Rajwinder Lucas MD;  Location: [facility]       Patient Active Problem List   Diagnosis     Hyperlipidemia LDL goal <160     Bulimia nervosa     Mild alcohol abuse in sustained remission     IUD (intrauterine device) in place     Gender dysphoria in adult     History of migraine with aura     Family history of malignant neoplasm of breast       Current Outpatient Medications   Medication Sig Dispense Refill     cetirizine (ZYRTEC) 10 MG tablet Take 10 mg by mouth daily       estradiol (ESTRACE) 0.1 MG/GM vaginal cream Place 2 g vaginally twice a week 42.5 g 1     testosterone (ANDROGEL 1.62 % PUMP) 20.25 MG/ACT gel Place 1 Pump onto the skin daily Apply from dispenser to clean, dry, intact skin of the upper arms and shoulders. 75 g 1       History   Smoking Status      "Former Smoker     Packs/day: 1.00     Years: 6.00     Types: Cigarettes     Quit date: 1/3/2013   Smokeless Tobacco     Never Used          Allergies   Allergen Reactions     Amoxicillin Hives       Problem, Medication and Allergy Lists were reviewed and updated if needed..         Review of Systems:      General    Fat redistribution: no     Weight change: no HEENT    Voice change: no     Cardiovascular (CV)    Chest Pains: no    Shortness of breath: no Chest    Decreased exercise tolerance:  no    Breast changes/development: not applicable,  Bilateral mastectomy      Gastrointestinal (GI)    Abdominal pain:  no    Change in appetite: no Skin    Acne or oily skin: YES- but tends to have this in the summer time    Change in hair: no     Genitourinary ()    Abnormal vaginal bleeding: no,     Decreased spontaneous erections: not applicable    Change in libido: no     New sexual partners: no Musculoskeletal    Leg pain or swelling: no      Psychiatric (Psych)    Depression: no     Anxiety/Panic: no    Mood:  \"fine\"                    Physical Exam:     Vitals:    07/13/21 0759   BP: 114/72   Pulse: 53   Resp: 16   Temp: 97.9  F (36.6  C)   TempSrc: Oral   SpO2: 99%     BMI= There is no height or weight on file to calculate BMI.   Wt Readings from Last 10 Encounters:   05/12/21 95.4 kg (210 lb 6.4 oz)   09/25/19 81 kg (178 lb 9.2 oz)   09/16/19 82.1 kg (181 lb)   07/09/19 84.1 kg (185 lb 6.4 oz)   04/02/19 94.9 kg (209 lb 3.2 oz)   06/17/16 101.6 kg (224 lb)   09/05/14 83.4 kg (183 lb 12.8 oz)   04/25/13 70.4 kg (155 lb 4 oz)   11/01/12 65.4 kg (144 lb 2 oz)   08/10/11 61.9 kg (136 lb 6 oz)       General: Alert and oriented, in no acute distress.   Skin: Warm and dry, no abnormalities noted.   Eyes: Extra-ocular muscles intact, pupils equal and reactive.   ENT: Speech intact, nasal passages open, no hearing impairment noted.   CV: No cyanosis or pallor, warm and well perfused.   Respiratory: No respiratory distress, " no accessory muscle use.   Neuro: Gait and station normal, comprehension intact. Gross and fine motor skills intact.   Psychiatric: Mood and affect appear normal.   Extremities: Warm, able to move all four extremities at will.               Labs:      Results from the last 24 hours  Results for orders placed or performed in visit on 07/13/21 (from the past 24 hour(s))   CBC with Platelets & Differential    Narrative    The following orders were created for panel order CBC with Platelets & Differential.  Procedure                               Abnormality         Status                     ---------                               -----------         ------                     CBC with platelets and d...[[id number]]                      Final result                 Please view results for these tests on the individual orders.   CBC with platelets and differential   Result Value Ref Range    WBC Count 4.2 4.0 - 11.0 10e3/uL    RBC Count 5.00 10e6/uL    Hemoglobin 14.6 g/dL    Hematocrit 46.1 %    MCV 92 78 - 100 fL    MCH 29.2 26.5 - 33.0 pg    MCHC 31.7 31.5 - 36.5 g/dL    RDW 12.6 10.0 - 15.0 %    Platelet Count 294 150 - 450 10e3/uL    % Neutrophils 53 %    % Lymphocytes 35 %    % Monocytes 8 %    % Eosinophils 4 %    % Basophils 1 %    % Immature Granulocytes 0 %    Absolute Neutrophils 2.2 1.6 - 8.3 10e3/uL    Absolute Lymphocytes 1.5 0.8 - 5.3 10e3/uL    Absolute Monocytes 0.3 0.0 - 1.3 10e3/uL    Absolute Eosinophils 0.2 0.0 - 0.7 10e3/uL    Absolute Basophils 0.0 0.0 - 0.2 10e3/uL    Absolute Immature Granulocytes 0.0 <=0.0 10e3/uL    Narrative    The sex of this patient cannot be reliably determined based on discrepancies in demographics (legal sex, sex assigned at birth, gender identity).  Both male and female reference ranges are provided where applicable.  Careful evaluation of the patient s results as compared to the gender specific reference intervals is required in this setting.        Assessment and Plan      Gender dysphoria  Doing well on 3 months of low dose testosterone androgel. Currently using 1 pump/day. Would like to try increasing to 2 pumps, but does not want to commit to higher dose today. Advised to try 2 pumps, and request refill as needed.     3-month follow-up labs ordered: CBC, CMP, TT, Lipid Panel    Contraception  Patient has had hormonal IUD for 7 years, and inquiring about replacement. Patient is primarily using IUD to prevent menstruation. Discussed that menstruation should decrease with continued testosterone use, though contraception is still recommended to prevent pregnancy. Concerned about pain due to testosterone-related vaginal atrophy. Advised to use estradiol vaginal cream 2-3x weekly for 2 weeks leading up to IUD replacement/removal appointment.         Cathie Carranza MD

## 2021-07-13 NOTE — PATIENT INSTRUCTIONS
Today we discussed your hormone replacement therapy with Testosterone transdermal gel. As discussed, you may try 2 pumps for some time to see if you are comfortable with this increase. Please get in touch through [facility] if you need a refill.    We also ordered some basic labs, and I will contact you through [facility] with your results.    Cathie Carranza MD

## 2021-07-14 LAB
ALBUMIN SERPL-MCNC: 3.9 G/DL (ref 3.4–5)
ALP SERPL-CCNC: 50 U/L (ref 40–150)
ALT SERPL W P-5'-P-CCNC: 20 U/L (ref 0–70)
ANION GAP SERPL CALCULATED.3IONS-SCNC: 3 MMOL/L (ref 3–14)
AST SERPL W P-5'-P-CCNC: 11 U/L (ref 0–45)
BILIRUB SERPL-MCNC: 0.7 MG/DL (ref 0.2–1.3)
BUN SERPL-MCNC: 8 MG/DL (ref 7–30)
CALCIUM SERPL-MCNC: 8.7 MG/DL (ref 8.5–10.1)
CHLORIDE BLD-SCNC: 105 MMOL/L (ref 94–109)
CHOLEST SERPL-MCNC: 137 MG/DL
CO2 SERPL-SCNC: 29 MMOL/L (ref 20–32)
CREAT SERPL-MCNC: 0.83 MG/DL (ref 0.52–1.25)
FASTING STATUS PATIENT QL REPORTED: NO
GFR SERPL CREATININE-BSD FRML MDRD: >90 ML/MIN/1.73M2
GLUCOSE BLD-MCNC: 77 MG/DL (ref 70–99)
HDLC SERPL-MCNC: 47 MG/DL
LDLC SERPL CALC-MCNC: 83 MG/DL
NONHDLC SERPL-MCNC: 90 MG/DL
POTASSIUM BLD-SCNC: 4.1 MMOL/L (ref 3.4–5.3)
PROT SERPL-MCNC: 7.5 G/DL (ref 6.8–8.8)
SODIUM SERPL-SCNC: 137 MMOL/L (ref 133–144)
TRIGL SERPL-MCNC: 36 MG/DL

## 2021-07-15 LAB — TESTOST SERPL-MCNC: 410 NG/DL (ref 8–950)

## 2021-07-19 NOTE — PROGRESS NOTES
Preceptor Attestation:   Patient seen, evaluated and discussed with the resident. I have verified the content of the note, which accurately reflects my assessment of the patient and the plan of care.   Supervising Physician:  Jade Banks, DO

## 2021-07-29 DIAGNOSIS — F64.9 GENDER DYSPHORIA: ICD-10-CM

## 2021-07-29 NOTE — TELEPHONE ENCOUNTER

## 2021-08-04 RX ORDER — TESTOSTERONE 1.62 MG/G
1 GEL TRANSDERMAL DAILY
Qty: 75 G | Refills: 1 | Status: SHIPPED | OUTPATIENT
Start: 2021-08-04 | End: 2021-08-11

## 2021-08-11 DIAGNOSIS — F64.9 GENDER DYSPHORIA: ICD-10-CM

## 2021-08-11 RX ORDER — TESTOSTERONE 1.62 MG/G
2 GEL TRANSDERMAL DAILY
Qty: 75 G | Refills: 1
Start: 2021-08-11 | End: 2021-08-12

## 2021-08-11 RX ORDER — TESTOSTERONE 1.62 MG/G
2 GEL TRANSDERMAL DAILY
Qty: 75 G | Refills: 0 | Status: CANCELLED | OUTPATIENT
Start: 2021-08-11

## 2021-08-11 NOTE — CONFIDENTIAL NOTE
Medication refill for androgel, increased from 1 dose to 2 doses. Pharmacy requiring new prescription.

## 2021-08-12 RX ORDER — TESTOSTERONE 1.62 MG/G
2 GEL TRANSDERMAL DAILY
Qty: 75 G | Refills: 1 | Status: SHIPPED | OUTPATIENT
Start: 2021-08-12 | End: 2021-10-27

## 2021-09-25 ENCOUNTER — HEALTH MAINTENANCE LETTER (OUTPATIENT)
Age: 36
End: 2021-09-25

## 2021-10-22 DIAGNOSIS — F64.9 GENDER DYSPHORIA: ICD-10-CM

## 2021-10-22 NOTE — TELEPHONE ENCOUNTER
"Request for medication refill: sending controlled to PCP     Providers if patient needs an appointment and you are willing to give a one month supply please refill for one month and  send a letter/MyChart using \".SMILLIMITEDREFILL\" .smillimited and route chart to \"P SMI \" (Giving one month refill in non controlled medications is strongly recommended before denial)    If refill has been denied, meaning absolutely no refills without visit, please complete the smart phrase \".smirxrefuse\" and route it to the \"P SMI MED REFILLS\"  pool to inform the patient and the pharmacy.    Pauline Mckinley RN        "

## 2021-10-22 NOTE — TELEPHONE ENCOUNTER
[facility] phone call message- patient requesting a refill:    Full Medication Name: testosterone (ANDROGEL 1.62 % PUMP) 20.25 MG/ACT gel    Dose: Place 2 Pump onto the skin daily Apply from dispenser to clean, dry, intact skin of the upper arms and shoulders. - Transdermal    Pharmacy confirmed as   [id number] - [place], [place] - [address]  [place] [address]  Phone: [phone] Fax: [phone]  : Yes    Additional Comments: Patient is out of medication. Requesting refill and wants it to be sent to new [facility] in [place] she changed pharmacy through [facility]    OK to leave a message on voice mail? Yes    Primary language: English      needed? No    Call taken on October 22, 2021 at 2:00 PM by Mayra Sanchez

## 2021-10-26 NOTE — TELEPHONE ENCOUNTER
Patient following up on refill request from 10/21. RN routing high priority to PCP to address and refill if appropriate.     Miriam Fountain RN

## 2021-10-26 NOTE — TELEPHONE ENCOUNTER
Patient called to follow up on RX request from 10/21. Again wants to make sure we send it to the [facility]'s in [place] [facility].

## 2021-10-27 RX ORDER — TESTOSTERONE 1.62 MG/G
2 GEL TRANSDERMAL DAILY
Qty: 75 G | Refills: 1 | Status: SHIPPED | OUTPATIENT
Start: 2021-10-27 | End: 2022-01-10

## 2021-10-27 NOTE — TELEPHONE ENCOUNTER
RN routing to preceptor on site today as initial refill request came in on 10/21.     Miriam Fountain RN

## 2021-12-29 DIAGNOSIS — F64.9 GENDER DYSPHORIA: ICD-10-CM

## 2022-01-02 RX ORDER — TESTOSTERONE 1.62 MG/G
2 GEL TRANSDERMAL DAILY
Qty: 75 G | Refills: 1 | Status: CANCELLED | OUTPATIENT
Start: 2022-01-02

## 2022-01-03 DIAGNOSIS — F64.9 GENDER DYSPHORIA: ICD-10-CM

## 2022-01-03 RX ORDER — TESTOSTERONE 1.62 MG/G
2 GEL TRANSDERMAL DAILY
Qty: 75 G | Refills: 1 | Status: CANCELLED | OUTPATIENT
Start: 2022-01-03

## 2022-01-10 RX ORDER — TESTOSTERONE 1.62 MG/G
2 GEL TRANSDERMAL DAILY
Qty: 75 G | Refills: 1 | Status: SHIPPED | OUTPATIENT
Start: 2022-01-10

## 2022-01-15 ENCOUNTER — HEALTH MAINTENANCE LETTER (OUTPATIENT)
Age: 37
End: 2022-01-15

## 2022-12-20 NOTE — TELEPHONE ENCOUNTER
FUTURE VISIT INFORMATION      SURGERY INFORMATION:    Date: 12/27/2022    Location:  OR    Surgeon:  Ros Joseph DPM    Anesthesia Type:  General    Procedure: FIRST METATARSOPHALANGEAL JOINT ARTHRODESIS , RIGHT FOOT      RECORDS REQUESTED FROM:       Primary Care Provider: Cathie Bowman MD ([facility])

## 2022-12-23 ENCOUNTER — OFFICE VISIT (OUTPATIENT)
Dept: SURGERY | Facility: CLINIC | Age: 37
End: 2022-12-23
Payer: COMMERCIAL

## 2022-12-23 ENCOUNTER — ANESTHESIA EVENT (OUTPATIENT)
Dept: SURGERY | Facility: CLINIC | Age: 37
End: 2022-12-23
Payer: COMMERCIAL

## 2022-12-23 ENCOUNTER — PRE VISIT (OUTPATIENT)
Dept: SURGERY | Facility: CLINIC | Age: 37
End: 2022-12-23

## 2022-12-23 VITALS
TEMPERATURE: 97.9 F | OXYGEN SATURATION: 97 % | HEART RATE: 54 BPM | SYSTOLIC BLOOD PRESSURE: 112 MMHG | HEIGHT: 68 IN | DIASTOLIC BLOOD PRESSURE: 64 MMHG | BODY MASS INDEX: 31.92 KG/M2 | WEIGHT: 210.6 LBS | RESPIRATION RATE: 16 BRPM

## 2022-12-23 DIAGNOSIS — Z01.818 PRE-OP EVALUATION: Primary | ICD-10-CM

## 2022-12-23 PROCEDURE — 99203 OFFICE O/P NEW LOW 30 MIN: CPT | Performed by: PHYSICIAN ASSISTANT

## 2022-12-23 RX ORDER — POLYETHYLENE GLYCOL 3350 17 G/17G
POWDER, FOR SOLUTION ORAL
COMMUNITY
Start: 2022-12-09

## 2022-12-23 RX ORDER — HYDROXYZINE HYDROCHLORIDE 25 MG/1
25 TABLET, FILM COATED ORAL PRN
COMMUNITY
Start: 2022-02-28

## 2022-12-23 RX ORDER — HYDROCODONE BITARTRATE AND ACETAMINOPHEN 10; 325 MG/1; MG/1
TABLET ORAL
COMMUNITY
Start: 2022-12-06

## 2022-12-23 RX ORDER — SENNOSIDES 8.6 MG
TABLET ORAL
COMMUNITY
Start: 2022-12-09

## 2022-12-23 ASSESSMENT — ENCOUNTER SYMPTOMS: SEIZURES: 0

## 2022-12-23 ASSESSMENT — LIFESTYLE VARIABLES: TOBACCO_USE: 1

## 2022-12-23 ASSESSMENT — PAIN SCALES - GENERAL: PAINLEVEL: NO PAIN (0)

## 2022-12-23 NOTE — H&P
Pre-Operative H & P     CC:  Preoperative exam to assess for increased cardiopulmonary risk while undergoing surgery and anesthesia.    Date of Encounter: 12/23/2022  Primary Care Physician:  Cathie Bowman     Reason for visit:   Encounter Diagnosis   Name Primary?     Pre-op evaluation Yes       HPI  Mami Charles is a 37 year old adult who presents for pre-operative H & P in preparation for  Procedure Information     Case: [id number] Date/Time: 12/27/22 1345    Procedure: FIRST METATARSOPHALANGEAL JOINT ARTHRODESIS , RIGHT FOOT (Right: Foot)    Anesthesia type: General    Diagnosis: Hallux rigidus [M20.20]    Pre-op diagnosis: Hallux rigidus [M20.20]    Location:  OR [address] OR    Providers: Ros Joseph DPM          Patient is being evaluated for comorbid conditions of former tobacco use    Roscoe Charles has a history of Hallux Rigidus. She is now scheduled for the above procedure,    History is obtained from the patient and chart review    Hx of abnormal bleeding or anti-platelet use: denies    Menstrual history: No LMP recorded (lmp unknown). (Menstrual status: IUD).     Past Medical History  Past Medical History:   Diagnosis Date     Bulimia nervosa 2000    in [employer] program     Eczema        Past Surgical History  Past Surgical History:   Procedure Laterality Date     TRANSGENDER MASTECTOMY Bilateral 9/25/2019    Procedure: Bilateral Simple Mastectomy With NO Nipple Graft Reconstruction. OnQ;  Surgeon: Rajwinder Lucas MD;  Location:  OR       Prior to Admission Medications  Current Outpatient Medications   Medication Sig Dispense Refill     cetirizine (ZYRTEC) 10 MG tablet Take 10 mg by mouth as needed       hydrOXYzine (ATARAX) 25 MG tablet Take 25 mg by mouth as needed       testosterone (ANDROGEL 1.62 % PUMP) 20.25 MG/ACT gel Place 2 Pump onto the skin daily Apply from dispenser to clean, dry, intact skin of the upper arms and shoulders. 75 g 1     estradiol (ESTRACE) 0.1 MG/GM vaginal cream  Place 2 g vaginally twice a week (Patient not taking: Reported on 2022) 42.5 g 1     HYDROcodone-acetaminophen (NORCO)  MG per tablet  (Patient not taking: Reported on 2022)       LORazepam (ATIVAN) 1 MG tablet Take 1 tablet (1 mg) by mouth once as needed for anxiety (prior to procedure) (Patient not taking: Reported on 2022) 1 tablet 0     polyethylene glycol (MIRALAX) 17 GM/Dose powder MIX 1 CAPFUL WITH FULL GLASS OF WATER AND DRINK DAILY AS DIRECTED (Patient not taking: Reported on 2022)       sennosides (SENOKOT) 8.6 MG tablet  (Patient not taking: Reported on 2022)         Allergies  Allergies   Allergen Reactions     Amoxicillin Hives       Social History  Social History     Socioeconomic History     Marital status: Single     Spouse name: Not on file     Number of children: Not on file     Years of education: Not on file     Highest education level: Not on file   Occupational History     Not on file   Tobacco Use     Smoking status: Former     Packs/day: 1.00     Years: 6.00     Pack years: 6.00     Types: Cigarettes     Quit date: 1/3/2013     Years since quittin.9     Smokeless tobacco: Never   Substance and Sexual Activity     Alcohol use: No     Drug use: No     Sexual activity: Yes     Partners: Female   Other Topics Concern     Parent/sibling w/ CABG, MI or angioplasty before 65F 55M? No   Social History Narrative     Not on file     Social Determinants of Health     Financial Resource Strain: Not on file   Food Insecurity: Not on file   Transportation Needs: Not on file   Physical Activity: Not on file   Stress: Not on file   Social Connections: Not on file   Intimate Partner Violence: Not on file   Housing Stability: Not on file       Family History  Family History   Problem Relation Age of Onset     Hypertension Mother      Glaucoma Father      Cancer Father 72        melanoma and lung cancer     Hypertension Maternal Grandmother      Breast Cancer Maternal  "Grandmother 63     Hypertension Maternal Grandfather      Colon Cancer Paternal Grandfather      Deep Vein Thrombosis (DVT) No family hx of        Review of Systems  The complete review of systems is negative other than noted in the HPI or here.   Anesthesia Evaluation   Pt has had prior anesthetic.     No history of anesthetic complications       ROS/MED HX  ENT/Pulmonary:     (+) tobacco use, Past use,     Neurologic:  - neg neurologic ROS  (-) no seizures   Cardiovascular:    (-) taking anticoagulants/antiplatelets   METS/Exercise Tolerance:  Comment: 20,000 steps daily without exertional symptoms    Hematologic:  - neg hematologic  ROS  (-) history of blood clots and history of blood transfusion   Musculoskeletal: Comment: Right foot- hallux rigidus      GI/Hepatic:  - neg GI/hepatic ROS  (-) GERD   Renal/Genitourinary:  - neg Renal ROS     Endo:  - neg endo ROS  (-) chronic steroid usage   Psychiatric/Substance Use:       Infectious Disease:  - neg infectious disease ROS     Malignancy:  - neg malignancy ROS     Other:            /64 (BP Location: Right arm, Patient Position: Sitting, Cuff Size: Adult Regular)   Pulse 54   Temp 97.9  F (36.6  C) (Oral)   Resp 16   Ht 1.715 m (5' 7.5\")   Wt 95.5 kg (210 lb 9.6 oz)   LMP  (LMP Unknown)   SpO2 97%   Breastfeeding No   BMI 32.50 kg/m      Physical Exam   Constitutional: Awake, alert, cooperative, no apparent distress, and appears stated age.  Eyes: Pupils equal, round and reactive to light, extra ocular muscles intact, sclera clear, conjunctiva normal.  HENT: Normocephalic, oral pharynx with moist mucus membranes, good dentition.  Respiratory: Clear to auscultation bilaterally, no crackles or wheezing.  Cardiovascular: Regular rate and rhythm, normal S1 and S2, and no murmur noted.  Carotids no bruits. No edema. Palpable pulses to radial  arteries.   GI: Normal bowel sounds, soft, non-distended, non-tender  Genitourinary:  deferred  Skin: Warm and " dry.  No rashes at anticipated surgical site.   Musculoskeletal: Full ROM of neck. There is no redness, warmth, or swelling of the exposed joints.  Neurologic: Awake, alert, oriented to name, place and time. Cranial nerves II-XII are grossly intact. Gait is normal.   Neuropsychiatric: Calm, cooperative. Normal affect.     Prior Labs/Diagnostic Studies   All labs and imaging personally reviewed     Component      Latest Ref Rng & Units 7/13/2021   WBC      4.0 - 11.0 10e3/uL 4.2   RBC Count      10e6/uL 5.00   Hemoglobin      g/dL 14.6   Hematocrit      % 46.1   MCV      78 - 100 fL 92   MCH      26.5 - 33.0 pg 29.2   MCHC      31.5 - 36.5 g/dL 31.7   RDW      10.0 - 15.0 % 12.6   Platelet Count      150 - 450 10e3/uL 294   % Neutrophils      % 53   % Lymphocytes      % 35   % Monocytes      % 8   % Eosinophils      % 4   % Basophils      % 1   % Immature Granulocytes      % 0   Absolute Neutrophils      1.6 - 8.3 10e3/uL 2.2   Absolute Lymphocytes      0.8 - 5.3 10e3/uL 1.5   Absolute Monocytes      0.0 - 1.3 10e3/uL 0.3   Absolute Eosinophils      0.0 - 0.7 10e3/uL 0.2   Absolute Basophils      0.0 - 0.2 10e3/uL 0.0   Absolute Immature Granulocytes      <=0.0 10e3/uL 0.0   Sodium      133 - 144 mmol/L 137   Potassium      3.4 - 5.3 mmol/L 4.1   Chloride      94 - 109 mmol/L 105   Carbon Dioxide      20 - 32 mmol/L 29   Anion Gap      3 - 14 mmol/L 3   Urea Nitrogen      7 - 30 mg/dL 8   Creatinine      0.52 - 1.25 mg/dL 0.83   Calcium      8.5 - 10.1 mg/dL 8.7   Glucose      70 - 99 mg/dL 77   Alkaline Phosphatase      40 - 150 U/L 50   AST      0 - 45 U/L 11   ALT      0 - 70 U/L 20   Protein Total      6.8 - 8.8 g/dL 7.5   Albumin      3.4 - 5.0 g/dL 3.9   Bilirubin Total      0.2 - 1.3 mg/dL 0.7   GFR Estimate      >60 mL/min/1.73m2 >90       EKG/ stress test - if available please see in ROS above   No results found.  No flowsheet data found.      The patient's records and results personally reviewed by this  "provider.     Outside records reviewed from: Care Everywhere    LAB/DIAGNOSTIC STUDIES TODAY:  none    Assessment      Mami Charles is a 37 year old adult seen as a PAC referral for risk assessment and optimization for anesthesia.    Plan/Recommendations  Pt will be optimized for the proposed procedure.  See below for details on the assessment, risk, and preoperative recommendations    NEUROLOGY  - No history of TIA, CVA or seizure  -Post Op delirium risk factors:  No risk identified    ENT  - No current airway concerns.  Will need to be reassessed day of surgery.  Mallampati: I  TM: > 3    CARDIAC  - No history of CAD, Hypertension and Afib   -denies previous cardiac history or symptoms   - METS (Metabolic Equivalents)  Patient performs 4 or more METS exercise without symptoms            Total Score: 0      RCRI-Very low risk: Class 1 0.4% complication rate            Total Score: 0        PULMONARY  [place] Low Risk            Total Score: 0      - Denies asthma or inhaler use  - Tobacco History      History   Smoking Status     Former     Packs/day: 1.00     Years: 6.00     Types: Cigarettes     Quit date: 1/3/2013   Smokeless Tobacco     Never       GI  PONV High Risk  Total Score: 3           1 AN PONV: Pt is Female    1 AN PONV: Patient is not a current smoker    1 AN PONV: Intended Post Op Opioids        /RENAL  - Baseline Creatinine WNL    ENDOCRINE    - BMI: Estimated body mass index is 32.5 kg/m  as calculated from the following:    Height as of this encounter: 1.715 m (5' 7.5\").    Weight as of this encounter: 95.5 kg (210 lb 9.6 oz).  Obesity (BMI >30)  - No history of Diabetes Mellitus    HEME  VTE Low Risk 0.26%            Total Score: 0      - No history of abnormal bleeding or antiplatelet use.    MSK  -hallux rigidus with the above procedure planned     Different anesthesia methods/types have been discussed with the patient, but they are aware that the final plan will be decided by the assigned " anesthesia provider on the date of service.    The patient is optimized for their procedure. No further diagnostic testing indicated.      On the day of service:     Prep time: 8 minutes  Visit time: 10 minutes  Documentation time: 5 minutes  ------------------------------------------  Total time: 23 minutes      Ainsley Irwin PA-C  Preoperative Assessment Center  [facility]  Phone: [phone]  Fax: [phone]

## 2022-12-23 NOTE — H&P (VIEW-ONLY)
Pre-Operative H & P     CC:  Preoperative exam to assess for increased cardiopulmonary risk while undergoing surgery and anesthesia.    Date of Encounter: 12/23/2022  Primary Care Physician:  Cathie Bowman     Reason for visit:   Encounter Diagnosis   Name Primary?     Pre-op evaluation Yes       HPI  Mami Charles is a 37 year old adult who presents for pre-operative H & P in preparation for  Procedure Information     Case: [id number] Date/Time: 12/27/22 1345    Procedure: FIRST METATARSOPHALANGEAL JOINT ARTHRODESIS , RIGHT FOOT (Right: Foot)    Anesthesia type: General    Diagnosis: Hallux rigidus [M20.20]    Pre-op diagnosis: Hallux rigidus [M20.20]    Location:  OR [address] OR    Providers: Ros Joseph DPM          Patient is being evaluated for comorbid conditions of former tobacco use    Roscoe Charles has a history of Hallux Rigidus. She is now scheduled for the above procedure,    History is obtained from the patient and chart review    Hx of abnormal bleeding or anti-platelet use: denies    Menstrual history: No LMP recorded (lmp unknown). (Menstrual status: IUD).     Past Medical History  Past Medical History:   Diagnosis Date     Bulimia nervosa 2000    in [employer] [facility]     Eczema        Past Surgical History  Past Surgical History:   Procedure Laterality Date     TRANSGENDER MASTECTOMY Bilateral 9/25/2019    Procedure: Bilateral Simple Mastectomy With NO Nipple Graft Reconstruction. OnQ;  Surgeon: Rajwinder Lucas MD;  Location:  OR       Prior to Admission Medications  Current Outpatient Medications   Medication Sig Dispense Refill     cetirizine (ZYRTEC) 10 MG tablet Take 10 mg by mouth as needed       hydrOXYzine (ATARAX) 25 MG tablet Take 25 mg by mouth as needed       testosterone (ANDROGEL 1.62 % PUMP) 20.25 MG/ACT gel Place 2 Pump onto the skin daily Apply from dispenser to clean, dry, intact skin of the upper arms and shoulders. 75 g 1     estradiol (ESTRACE) 0.1 MG/GM vaginal cream  Place 2 g vaginally twice a week (Patient not taking: Reported on 2022) 42.5 g 1     HYDROcodone-acetaminophen (NORCO)  MG per tablet  (Patient not taking: Reported on 2022)       LORazepam (ATIVAN) 1 MG tablet Take 1 tablet (1 mg) by mouth once as needed for anxiety (prior to procedure) (Patient not taking: Reported on 2022) 1 tablet 0     polyethylene glycol (MIRALAX) 17 GM/Dose powder MIX 1 CAPFUL WITH FULL GLASS OF WATER AND DRINK DAILY AS DIRECTED (Patient not taking: Reported on 2022)       sennosides (SENOKOT) 8.6 MG tablet  (Patient not taking: Reported on 2022)         Allergies  Allergies   Allergen Reactions     Amoxicillin Hives       Social History  Social History     Socioeconomic History     Marital status: Single     Spouse name: Not on file     Number of children: Not on file     Years of education: Not on file     Highest education level: Not on file   Occupational History     Not on file   Tobacco Use     Smoking status: Former     Packs/day: 1.00     Years: 6.00     Pack years: 6.00     Types: Cigarettes     Quit date: 1/3/2013     Years since quittin.9     Smokeless tobacco: Never   Substance and Sexual Activity     Alcohol use: No     Drug use: No     Sexual activity: Yes     Partners: Female   Other Topics Concern     Parent/sibling w/ CABG, MI or angioplasty before 65F 55M? No   Social History Narrative     Not on file     Social Determinants of Health     Financial Resource Strain: Not on file   Food Insecurity: Not on file   Transportation Needs: Not on file   Physical Activity: Not on file   Stress: Not on file   Social Connections: Not on file   Intimate Partner Violence: Not on file   Housing Stability: Not on file       Family History  Family History   Problem Relation Age of Onset     Hypertension Mother      Glaucoma Father      Cancer Father 72        melanoma and lung cancer     Hypertension Maternal Grandmother      Breast Cancer Maternal  "Grandmother 63     Hypertension Maternal Grandfather      Colon Cancer Paternal Grandfather      Deep Vein Thrombosis (DVT) No family hx of        Review of Systems  The complete review of systems is negative other than noted in the HPI or here.   Anesthesia Evaluation   Pt has had prior anesthetic.     No history of anesthetic complications       ROS/MED HX  ENT/Pulmonary:     (+) tobacco use, Past use,     Neurologic:  - neg neurologic ROS  (-) no seizures   Cardiovascular:    (-) taking anticoagulants/antiplatelets   METS/Exercise Tolerance:  Comment: 20,000 steps daily without exertional symptoms    Hematologic:  - neg hematologic  ROS  (-) history of blood clots and history of blood transfusion   Musculoskeletal: Comment: Right foot- hallux rigidus      GI/Hepatic:  - neg GI/hepatic ROS  (-) GERD   Renal/Genitourinary:  - neg Renal ROS     Endo:  - neg endo ROS  (-) chronic steroid usage   Psychiatric/Substance Use:       Infectious Disease:  - neg infectious disease ROS     Malignancy:  - neg malignancy ROS     Other:            /64 (BP Location: Right arm, Patient Position: Sitting, Cuff Size: Adult Regular)   Pulse 54   Temp 97.9  F (36.6  C) (Oral)   Resp 16   Ht 1.715 m (5' 7.5\")   Wt 95.5 kg (210 lb 9.6 oz)   LMP  (LMP Unknown)   SpO2 97%   Breastfeeding No   BMI 32.50 kg/m      Physical Exam   Constitutional: Awake, alert, cooperative, no apparent distress, and appears stated age.  Eyes: Pupils equal, round and reactive to light, extra ocular muscles intact, sclera clear, conjunctiva normal.  HENT: Normocephalic, oral pharynx with moist mucus membranes, good dentition.  Respiratory: Clear to auscultation bilaterally, no crackles or wheezing.  Cardiovascular: Regular rate and rhythm, normal S1 and S2, and no murmur noted.  Carotids no bruits. No edema. Palpable pulses to radial  arteries.   GI: Normal bowel sounds, soft, non-distended, non-tender  Genitourinary:  deferred  Skin: Warm and " dry.  No rashes at anticipated surgical site.   Musculoskeletal: Full ROM of neck. There is no redness, warmth, or swelling of the exposed joints.  Neurologic: Awake, alert, oriented to name, place and time. Cranial nerves II-XII are grossly intact. Gait is normal.   Neuropsychiatric: Calm, cooperative. Normal affect.     Prior Labs/Diagnostic Studies   All labs and imaging personally reviewed     Component      Latest Ref Rng & Units 7/13/2021   WBC      4.0 - 11.0 10e3/uL 4.2   RBC Count      10e6/uL 5.00   Hemoglobin      g/dL 14.6   Hematocrit      % 46.1   MCV      78 - 100 fL 92   MCH      26.5 - 33.0 pg 29.2   MCHC      31.5 - 36.5 g/dL 31.7   RDW      10.0 - 15.0 % 12.6   Platelet Count      150 - 450 10e3/uL 294   % Neutrophils      % 53   % Lymphocytes      % 35   % Monocytes      % 8   % Eosinophils      % 4   % Basophils      % 1   % Immature Granulocytes      % 0   Absolute Neutrophils      1.6 - 8.3 10e3/uL 2.2   Absolute Lymphocytes      0.8 - 5.3 10e3/uL 1.5   Absolute Monocytes      0.0 - 1.3 10e3/uL 0.3   Absolute Eosinophils      0.0 - 0.7 10e3/uL 0.2   Absolute Basophils      0.0 - 0.2 10e3/uL 0.0   Absolute Immature Granulocytes      <=0.0 10e3/uL 0.0   Sodium      133 - 144 mmol/L 137   Potassium      3.4 - 5.3 mmol/L 4.1   Chloride      94 - 109 mmol/L 105   Carbon Dioxide      20 - 32 mmol/L 29   Anion Gap      3 - 14 mmol/L 3   Urea Nitrogen      7 - 30 mg/dL 8   Creatinine      0.52 - 1.25 mg/dL 0.83   Calcium      8.5 - 10.1 mg/dL 8.7   Glucose      70 - 99 mg/dL 77   Alkaline Phosphatase      40 - 150 U/L 50   AST      0 - 45 U/L 11   ALT      0 - 70 U/L 20   Protein Total      6.8 - 8.8 g/dL 7.5   Albumin      3.4 - 5.0 g/dL 3.9   Bilirubin Total      0.2 - 1.3 mg/dL 0.7   GFR Estimate      >60 mL/min/1.73m2 >90       EKG/ stress test - if available please see in ROS above   No results found.  No flowsheet data found.      The patient's records and results personally reviewed by this  "provider.     Outside records reviewed from: Care Everywhere    LAB/DIAGNOSTIC STUDIES TODAY:  none    Assessment      Mami Charles is a 37 year old adult seen as a PAC referral for risk assessment and optimization for anesthesia.    Plan/Recommendations  Pt will be optimized for the proposed procedure.  See below for details on the assessment, risk, and preoperative recommendations    NEUROLOGY  - No history of TIA, CVA or seizure  -Post Op delirium risk factors:  No risk identified    ENT  - No current airway concerns.  Will need to be reassessed day of surgery.  Mallampati: I  TM: > 3    CARDIAC  - No history of CAD, Hypertension and Afib   -denies previous cardiac history or symptoms   - METS (Metabolic Equivalents)  Patient performs 4 or more METS exercise without symptoms            Total Score: 0      RCRI-Very low risk: Class 1 0.4% complication rate            Total Score: 0        PULMONARY  [place] Low Risk            Total Score: 0      - Denies asthma or inhaler use  - Tobacco History      History   Smoking Status     Former     Packs/day: 1.00     Years: 6.00     Types: Cigarettes     Quit date: 1/3/2013   Smokeless Tobacco     Never       GI  PONV High Risk  Total Score: 3           1 AN PONV: Pt is Female    1 AN PONV: Patient is not a current smoker    1 AN PONV: Intended Post Op Opioids        /RENAL  - Baseline Creatinine WNL    ENDOCRINE    - BMI: Estimated body mass index is 32.5 kg/m  as calculated from the following:    Height as of this encounter: 1.715 m (5' 7.5\").    Weight as of this encounter: 95.5 kg (210 lb 9.6 oz).  Obesity (BMI >30)  - No history of Diabetes Mellitus    HEME  VTE Low Risk 0.26%            Total Score: 0      - No history of abnormal bleeding or antiplatelet use.    MSK  -hallux rigidus with the above procedure planned     Different anesthesia methods/types have been discussed with the patient, but they are aware that the final plan will be decided by the assigned " anesthesia provider on the date of service.    The patient is optimized for their procedure. No further diagnostic testing indicated.      On the day of service:     Prep time: 8 minutes  Visit time: 10 minutes  Documentation time: 5 minutes  ------------------------------------------  Total time: 23 minutes      Ainsley Irwin PA-C  Preoperative Assessment Center  [facility]  Phone: [phone]  Fax: [phone]

## 2022-12-27 ENCOUNTER — APPOINTMENT (OUTPATIENT)
Dept: GENERAL RADIOLOGY | Facility: CLINIC | Age: 37
End: 2022-12-27
Attending: PODIATRIST
Payer: COMMERCIAL

## 2022-12-27 ENCOUNTER — ANESTHESIA (OUTPATIENT)
Dept: SURGERY | Facility: CLINIC | Age: 37
End: 2022-12-27
Payer: COMMERCIAL

## 2022-12-27 ENCOUNTER — HOSPITAL ENCOUNTER (OUTPATIENT)
Facility: CLINIC | Age: 37
Discharge: HOME | End: 2022-12-27
Attending: PODIATRIST | Admitting: PODIATRIST
Payer: COMMERCIAL

## 2022-12-27 VITALS
RESPIRATION RATE: 16 BRPM | DIASTOLIC BLOOD PRESSURE: 65 MMHG | HEART RATE: 70 BPM | OXYGEN SATURATION: 96 % | WEIGHT: 211.1 LBS | HEIGHT: 67 IN | BODY MASS INDEX: 33.13 KG/M2 | TEMPERATURE: 97.8 F | SYSTOLIC BLOOD PRESSURE: 117 MMHG

## 2022-12-27 LAB — HCG UR QL: NEGATIVE

## 2022-12-27 PROCEDURE — 250N000011 HC RX IP 250 OP 636: Performed by: NURSE ANESTHETIST, CERTIFIED REGISTERED

## 2022-12-27 PROCEDURE — C1713 ANCHOR/SCREW BN/BN,TIS/BN: HCPCS | Performed by: PODIATRIST

## 2022-12-27 PROCEDURE — 258N000003 HC RX IP 258 OP 636: Performed by: ANESTHESIOLOGY

## 2022-12-27 PROCEDURE — 250N000011 HC RX IP 250 OP 636: Performed by: PODIATRIST

## 2022-12-27 PROCEDURE — 710N000009 HC RECOVERY PHASE 1, LEVEL 1, PER MIN: Performed by: PODIATRIST

## 2022-12-27 PROCEDURE — 272N000001 HC OR GENERAL SUPPLY STERILE: Performed by: PODIATRIST

## 2022-12-27 PROCEDURE — 250N000009 HC RX 250: Performed by: NURSE ANESTHETIST, CERTIFIED REGISTERED

## 2022-12-27 PROCEDURE — 81025 URINE PREGNANCY TEST: CPT | Performed by: ANESTHESIOLOGY

## 2022-12-27 PROCEDURE — 999N000179 XR SURGERY CARM FLUORO LESS THAN 5 MIN W STILLS

## 2022-12-27 PROCEDURE — 360N000083 HC SURGERY LEVEL 3 W/ FLUORO, PER MIN: Performed by: PODIATRIST

## 2022-12-27 PROCEDURE — 370N000017 HC ANESTHESIA TECHNICAL FEE, PER MIN: Performed by: PODIATRIST

## 2022-12-27 PROCEDURE — 710N000012 HC RECOVERY PHASE 2, PER MINUTE: Performed by: PODIATRIST

## 2022-12-27 PROCEDURE — C1769 GUIDE WIRE: HCPCS | Performed by: PODIATRIST

## 2022-12-27 PROCEDURE — C1762 CONN TISS, HUMAN(INC FASCIA): HCPCS | Performed by: PODIATRIST

## 2022-12-27 PROCEDURE — 999N000141 HC STATISTIC PRE-PROCEDURE NURSING ASSESSMENT: Performed by: PODIATRIST

## 2022-12-27 PROCEDURE — 250N000025 HC SEVOFLURANE, PER MIN: Performed by: PODIATRIST

## 2022-12-27 PROCEDURE — 999N000063 XR FOOT PORT RIGHT 2 VIEWS: Mod: RT

## 2022-12-27 PROCEDURE — 250N000009 HC RX 250: Performed by: PODIATRIST

## 2022-12-27 DEVICE — IMP SCR ARTHREX CORTICAL LP 3X12MM TI AR-8933-12: Type: IMPLANTABLE DEVICE | Site: FOOT | Status: FUNCTIONAL

## 2022-12-27 DEVICE — IMPLANTABLE DEVICE: Type: IMPLANTABLE DEVICE | Site: FOOT | Status: FUNCTIONAL

## 2022-12-27 DEVICE — GRAFT BONE PUTTY DBX 0.5ML 038005: Type: IMPLANTABLE DEVICE | Site: FOOT | Status: FUNCTIONAL

## 2022-12-27 DEVICE — SCREW BONE KREULOCK TITANIUM 12X3MM AR-8933VCL-12: Type: IMPLANTABLE DEVICE | Site: FOOT | Status: FUNCTIONAL

## 2022-12-27 DEVICE — SCREW VAL KREULOCK TI 3.0 X 16 AR-8933VCL-16: Type: IMPLANTABLE DEVICE | Site: FOOT | Status: FUNCTIONAL

## 2022-12-27 RX ORDER — DEXAMETHASONE SODIUM PHOSPHATE 4 MG/ML
INJECTION, SOLUTION INTRA-ARTICULAR; INTRALESIONAL; INTRAMUSCULAR; INTRAVENOUS; SOFT TISSUE PRN
Status: DISCONTINUED | OUTPATIENT
Start: 2022-12-27 | End: 2022-12-27

## 2022-12-27 RX ORDER — HYDROMORPHONE HCL IN WATER/PF 6 MG/30 ML
0.2 PATIENT CONTROLLED ANALGESIA SYRINGE INTRAVENOUS EVERY 5 MIN PRN
Status: DISCONTINUED | OUTPATIENT
Start: 2022-12-27 | End: 2022-12-27 | Stop reason: HOSPADM

## 2022-12-27 RX ORDER — PROPOFOL 10 MG/ML
INJECTION, EMULSION INTRAVENOUS CONTINUOUS PRN
Status: DISCONTINUED | OUTPATIENT
Start: 2022-12-27 | End: 2022-12-27

## 2022-12-27 RX ORDER — ONDANSETRON 4 MG/1
4 TABLET, ORALLY DISINTEGRATING ORAL EVERY 30 MIN PRN
Status: DISCONTINUED | OUTPATIENT
Start: 2022-12-27 | End: 2022-12-27 | Stop reason: HOSPADM

## 2022-12-27 RX ORDER — ONDANSETRON 2 MG/ML
4 INJECTION INTRAMUSCULAR; INTRAVENOUS EVERY 30 MIN PRN
Status: DISCONTINUED | OUTPATIENT
Start: 2022-12-27 | End: 2022-12-27 | Stop reason: HOSPADM

## 2022-12-27 RX ORDER — SODIUM CHLORIDE, SODIUM LACTATE, POTASSIUM CHLORIDE, CALCIUM CHLORIDE 600; 310; 30; 20 MG/100ML; MG/100ML; MG/100ML; MG/100ML
INJECTION, SOLUTION INTRAVENOUS CONTINUOUS
Status: DISCONTINUED | OUTPATIENT
Start: 2022-12-27 | End: 2022-12-27 | Stop reason: HOSPADM

## 2022-12-27 RX ORDER — PROPOFOL 10 MG/ML
INJECTION, EMULSION INTRAVENOUS PRN
Status: DISCONTINUED | OUTPATIENT
Start: 2022-12-27 | End: 2022-12-27

## 2022-12-27 RX ORDER — MEPERIDINE HYDROCHLORIDE 25 MG/ML
12.5 INJECTION INTRAMUSCULAR; INTRAVENOUS; SUBCUTANEOUS
Status: DISCONTINUED | OUTPATIENT
Start: 2022-12-27 | End: 2022-12-27 | Stop reason: HOSPADM

## 2022-12-27 RX ORDER — BUPIVACAINE HYDROCHLORIDE 5 MG/ML
INJECTION, SOLUTION PERINEURAL PRN
Status: DISCONTINUED | OUTPATIENT
Start: 2022-12-27 | End: 2022-12-27 | Stop reason: HOSPADM

## 2022-12-27 RX ORDER — FENTANYL CITRATE 0.05 MG/ML
25 INJECTION, SOLUTION INTRAMUSCULAR; INTRAVENOUS EVERY 5 MIN PRN
Status: DISCONTINUED | OUTPATIENT
Start: 2022-12-27 | End: 2022-12-27 | Stop reason: HOSPADM

## 2022-12-27 RX ORDER — LIDOCAINE 40 MG/G
CREAM TOPICAL
Status: DISCONTINUED | OUTPATIENT
Start: 2022-12-27 | End: 2022-12-27 | Stop reason: HOSPADM

## 2022-12-27 RX ORDER — HYDROMORPHONE HCL IN WATER/PF 6 MG/30 ML
0.4 PATIENT CONTROLLED ANALGESIA SYRINGE INTRAVENOUS EVERY 5 MIN PRN
Status: DISCONTINUED | OUTPATIENT
Start: 2022-12-27 | End: 2022-12-27 | Stop reason: HOSPADM

## 2022-12-27 RX ORDER — LIDOCAINE HYDROCHLORIDE 20 MG/ML
INJECTION, SOLUTION INFILTRATION; PERINEURAL PRN
Status: DISCONTINUED | OUTPATIENT
Start: 2022-12-27 | End: 2022-12-27

## 2022-12-27 RX ORDER — FENTANYL CITRATE 50 UG/ML
INJECTION, SOLUTION INTRAMUSCULAR; INTRAVENOUS PRN
Status: DISCONTINUED | OUTPATIENT
Start: 2022-12-27 | End: 2022-12-27

## 2022-12-27 RX ORDER — FENTANYL CITRATE 0.05 MG/ML
25 INJECTION, SOLUTION INTRAMUSCULAR; INTRAVENOUS
Status: CANCELLED | OUTPATIENT
Start: 2022-12-27

## 2022-12-27 RX ORDER — MAGNESIUM HYDROXIDE 1200 MG/15ML
LIQUID ORAL PRN
Status: DISCONTINUED | OUTPATIENT
Start: 2022-12-27 | End: 2022-12-27 | Stop reason: HOSPADM

## 2022-12-27 RX ORDER — KETOROLAC TROMETHAMINE 30 MG/ML
INJECTION, SOLUTION INTRAMUSCULAR; INTRAVENOUS PRN
Status: DISCONTINUED | OUTPATIENT
Start: 2022-12-27 | End: 2022-12-27

## 2022-12-27 RX ORDER — LIDOCAINE HYDROCHLORIDE 10 MG/ML
INJECTION, SOLUTION EPIDURAL; INFILTRATION; INTRACAUDAL; PERINEURAL PRN
Status: DISCONTINUED | OUTPATIENT
Start: 2022-12-27 | End: 2022-12-27 | Stop reason: HOSPADM

## 2022-12-27 RX ORDER — ONDANSETRON 2 MG/ML
INJECTION INTRAMUSCULAR; INTRAVENOUS PRN
Status: DISCONTINUED | OUTPATIENT
Start: 2022-12-27 | End: 2022-12-27

## 2022-12-27 RX ORDER — OXYCODONE HYDROCHLORIDE 5 MG/1
5 TABLET ORAL
Status: DISCONTINUED | OUTPATIENT
Start: 2022-12-27 | End: 2022-12-27 | Stop reason: HOSPADM

## 2022-12-27 RX ORDER — CLINDAMYCIN PHOSPHATE 900 MG/50ML
900 INJECTION, SOLUTION INTRAVENOUS
Status: COMPLETED | OUTPATIENT
Start: 2022-12-27 | End: 2022-12-27

## 2022-12-27 RX ORDER — FENTANYL CITRATE 0.05 MG/ML
50 INJECTION, SOLUTION INTRAMUSCULAR; INTRAVENOUS EVERY 5 MIN PRN
Status: DISCONTINUED | OUTPATIENT
Start: 2022-12-27 | End: 2022-12-27 | Stop reason: HOSPADM

## 2022-12-27 RX ADMIN — LIDOCAINE HYDROCHLORIDE 100 MG: 20 INJECTION, SOLUTION INFILTRATION; PERINEURAL at 14:20

## 2022-12-27 RX ADMIN — KETOROLAC TROMETHAMINE 30 MG: 30 INJECTION, SOLUTION INTRAMUSCULAR at 16:38

## 2022-12-27 RX ADMIN — DEXAMETHASONE SODIUM PHOSPHATE 4 MG: 4 INJECTION, SOLUTION INTRA-ARTICULAR; INTRALESIONAL; INTRAMUSCULAR; INTRAVENOUS; SOFT TISSUE at 14:31

## 2022-12-27 RX ADMIN — MIDAZOLAM 2 MG: 1 INJECTION INTRAMUSCULAR; INTRAVENOUS at 14:18

## 2022-12-27 RX ADMIN — SODIUM CHLORIDE, POTASSIUM CHLORIDE, SODIUM LACTATE AND CALCIUM CHLORIDE: 600; 310; 30; 20 INJECTION, SOLUTION INTRAVENOUS at 16:20

## 2022-12-27 RX ADMIN — PROPOFOL 300 MG: 10 INJECTION, EMULSION INTRAVENOUS at 14:20

## 2022-12-27 RX ADMIN — ONDANSETRON 4 MG: 2 INJECTION INTRAMUSCULAR; INTRAVENOUS at 16:20

## 2022-12-27 RX ADMIN — PROPOFOL 50 MCG/KG/MIN: 10 INJECTION, EMULSION INTRAVENOUS at 14:27

## 2022-12-27 RX ADMIN — FENTANYL CITRATE 25 MCG: 50 INJECTION, SOLUTION INTRAMUSCULAR; INTRAVENOUS at 16:31

## 2022-12-27 RX ADMIN — SODIUM CHLORIDE, POTASSIUM CHLORIDE, SODIUM LACTATE AND CALCIUM CHLORIDE: 600; 310; 30; 20 INJECTION, SOLUTION INTRAVENOUS at 12:51

## 2022-12-27 RX ADMIN — FENTANYL CITRATE 50 MCG: 50 INJECTION, SOLUTION INTRAMUSCULAR; INTRAVENOUS at 14:20

## 2022-12-27 RX ADMIN — CLINDAMYCIN PHOSPHATE 900 MG: 900 INJECTION, SOLUTION INTRAVENOUS at 12:51

## 2022-12-27 ASSESSMENT — ACTIVITIES OF DAILY LIVING (ADL)
ADLS_ACUITY_SCORE: 35

## 2022-12-27 ASSESSMENT — ENCOUNTER SYMPTOMS
DYSRHYTHMIAS: 0
SEIZURES: 0

## 2022-12-27 ASSESSMENT — LIFESTYLE VARIABLES: TOBACCO_USE: 1

## 2022-12-27 NOTE — ANESTHESIA POSTPROCEDURE EVALUATION
Patient: Mami Charles    Procedure: Procedure(s):  FIRST METATARSOPHALANGEAL JOINT ARTHRODESIS , RIGHT FOOT       Anesthesia Type:  General    Note:  Disposition: Outpatient   Postop Pain Control: Uneventful            Sign Out: Well controlled pain   PONV: No   Neuro/Psych: Uneventful            Sign Out: Acceptable/Baseline neuro status   Airway/Respiratory: Uneventful            Sign Out: Acceptable/Baseline resp. status   CV/Hemodynamics: Uneventful            Sign Out: Acceptable CV status; No obvious hypovolemia; No obvious fluid overload   Other NRE: NONE   DID A NON-ROUTINE EVENT OCCUR? No           Last vitals:  Vitals Value Taken Time   /76 12/27/22 1700   Temp 36.6  C (97.8  F) 12/27/22 1650   Pulse 78 12/27/22 1707   Resp 8 12/27/22 1707   SpO2 97 % 12/27/22 1707   Vitals shown include unvalidated device data.    Electronically Signed By: Martín Leal MD  December 27, 2022  5:08 PM

## 2022-12-27 NOTE — ANESTHESIA PREPROCEDURE EVALUATION
Anesthesia Pre-Procedure Evaluation    Patient: Mami Charles   MRN: [id number] : 1985        Procedure : Procedure(s):  FIRST METATARSOPHALANGEAL JOINT ARTHRODESIS , RIGHT FOOT          Past Medical History:   Diagnosis Date     Bulimia nervosa 2000    in [employer] [facility]     Eczema       Past Surgical History:   Procedure Laterality Date     TRANSGENDER MASTECTOMY Bilateral 2019    Procedure: Bilateral Simple Mastectomy With NO Nipple Graft Reconstruction. OnQ;  Surgeon: Rajwinder Lucas MD;  Location: [facility] OR      Allergies   Allergen Reactions     Amoxicillin Hives      Social History     Tobacco Use     Smoking status: Former     Packs/day: 1.00     Years: 6.00     Pack years: 6.00     Types: Cigarettes     Quit date: 1/3/2013     Years since quittin.9     Smokeless tobacco: Never   Substance Use Topics     Alcohol use: No      Wt Readings from Last 1 Encounters:   22 95.8 kg (211 lb 1.6 oz)        Prior to Admission medications    Medication Sig Start Date End Date Taking? Authorizing Provider   cetirizine (ZYRTEC) 10 MG tablet Take 10 mg by mouth as needed   Yes Reported, Patient   hydrOXYzine (ATARAX) 25 MG tablet Take 25 mg by mouth as needed 22  Yes Reported, Patient   testosterone (ANDROGEL 1.62 % PUMP) 20.25 MG/ACT gel Place 2 Pump onto the skin daily Apply from dispenser to clean, dry, intact skin of the upper arms and shoulders. 1/10/22  Yes Jade Banks DO   estradiol (ESTRACE) 0.1 MG/GM vaginal cream Place 2 g vaginally twice a week  Patient not taking: Reported on 2022   Poonam Barr DO   HYDROcodone-acetaminophen (NORCO)  MG per tablet  22   Reported, Patient   LORazepam (ATIVAN) 1 MG tablet Take 1 tablet (1 mg) by mouth once as needed for anxiety (prior to procedure)  Patient not taking: Reported on 2022 10/13/21   Jade Banks DO   polyethylene glycol (MIRALAX) 17 GM/Dose powder MIX 1 CAPFUL WITH FULL GLASS OF WATER  AND DRINK DAILY AS DIRECTED  Patient not taking: Reported on 12/23/2022 12/9/22   Reported, Patient   sennosides (SENOKOT) 8.6 MG tablet  12/9/22   Reported, Patient     Anesthesia Evaluation   Pt has had prior anesthetic. Type: General.    No history of anesthetic complications       ROS/MED HX  ENT/Pulmonary:     (+) tobacco use, Past use,  (-) asthma and sleep apnea   Neurologic:    (-) no seizures, no CVA and migraines   Cardiovascular:    (-) hypertension, CAD, [place], arrhythmias, valvular problems/murmurs and dyslipidemia   METS/Exercise Tolerance: >4 METS    Hematologic:    (-) history of blood clots and anemia   Musculoskeletal:    (-) arthritis   GI/Hepatic:    (-) GERD and liver disease   Renal/Genitourinary:    (-) renal disease and nephrolithiasis   Endo: Comment: Hx bulemia   (-) Type I DM, Type II DM, thyroid disease and obesity   Psychiatric/Substance Use:    (-) psychiatric history   Infectious Disease:    (-) Recent Fever   Malignancy:       Other:            Physical Exam    Airway        Mallampati: II   TM distance: > 3 FB   Neck ROM: full   Mouth opening: > 3 cm    Respiratory Devices and Support         Dental  no notable dental history         Cardiovascular   cardiovascular exam normal       Rhythm and rate: normal     Pulmonary   pulmonary exam normal        breath sounds clear to auscultation           OUTSIDE LABS:  CBC:   Lab Results   Component Value Date    WBC 4.2 07/13/2021    WBC 4.0 08/10/2011    HGB 14.6 07/13/2021    HGB 13.5 04/07/2021    HCT 46.1 07/13/2021    HCT 42.3 09/16/2019     07/13/2021     08/10/2011     BMP:   Lab Results   Component Value Date     07/13/2021    .9 09/16/2019    POTASSIUM 4.1 07/13/2021    POTASSIUM 3.2 (L) 09/16/2019    CHLORIDE 105 07/13/2021    CHLORIDE 106.1 09/16/2019    CO2 29 07/13/2021    CO2 24.8 09/16/2019    BUN 8 07/13/2021    BUN 13.0 09/16/2019    CR 0.83 07/13/2021    CR 0.8 09/16/2019    GLC 77 07/13/2021    GLC  61.9 (L) 09/16/2019     COAGS: No results found for: PTT, INR, FIBR  POC:   Lab Results   Component Value Date    HCG Negative 12/27/2022     HEPATIC:   Lab Results   Component Value Date    ALBUMIN 3.9 07/13/2021    PROTTOTAL 7.5 07/13/2021    ALT 20 07/13/2021    AST 11 07/13/2021    ALKPHOS 50 07/13/2021    BILITOTAL 0.7 07/13/2021     OTHER:   Lab Results   Component Value Date    AGGIE 8.7 07/13/2021       Anesthesia Plan    ASA Status:  2   NPO Status:  NPO Appropriate    Anesthesia Type: General.     - Airway: LMA   Induction: Propofol.   Maintenance: Balanced.        Consents    Anesthesia Plan(s) and associated risks, benefits, and realistic alternatives discussed. Questions answered and patient/representative(s) expressed understanding.    - Discussed:     - Discussed with:  Patient         Postoperative Care    Pain management: Multi-modal analgesia.   PONV prophylaxis: Ondansetron (or other 5HT-3), Dexamethasone or Solumedrol, Background Propofol Infusion     Comments:                Bree Vargas MD

## 2022-12-27 NOTE — INTERVAL H&P NOTE
"I have reviewed the surgical (or preoperative) H&P that is linked to this encounter, and examined the patient. There are no significant changes    Clinical Conditions Present on Arrival:  Clinically Significant Risk Factors Present on Admission                    # Obesity: Estimated body mass index is 33.06 kg/m  as calculated from the following:    Height as of this encounter: 1.702 m (5' 7\").    Weight as of this encounter: 95.8 kg (211 lb 1.6 oz).       "

## 2022-12-27 NOTE — DISCHARGE INSTRUCTIONS
Today you received Toradol, an antiinflammatory medication similar to Ibuprofen.  You should not take other antiinflammatory medication, such as Ibuprofen, Motrin, Advil, Aleve, Naprosyn, etc until 10:30 pm.        Same Day Surgery Discharge Instructions for  Sedation and General Anesthesia     It's not unusual to feel dizzy, light-headed or faint for up to 24 hours after surgery or while taking pain medication.  If you have these symptoms: sit for a few minutes before standing and have someone assist you when you get up to walk or use the bathroom.    You should rest and relax for the next 24 hours. We recommend you make arrangements to have an adult stay with you for at least 24 hours after your discharge.  Avoid hazardous and strenuous activity.    DO NOT DRIVE any vehicle or operate mechanical equipment for 24 hours following the end of your surgery.  Even though you may feel normal, your reactions may be affected by the medication you have received.    Do not drink alcoholic beverages for 24 hours following surgery.     Slowly progress to your regular diet as you feel able. It's not unusual to feel nauseated and/or vomit after receiving anesthesia.  If you develop these symptoms, drink clear liquids (apple juice, ginger ale, broth, 7-up, etc. ) until you feel better.  If your nausea and vomiting persists for 24 hours, please notify your surgeon.      All narcotic pain medications, along with inactivity and anesthesia, can cause constipation. Drinking plenty of liquids and increasing fiber intake will help.    For any questions of a medical nature, call your surgeon.    Do not make important decisions for 24 hours.    If you had general anesthesia, you may have a sore throat for a couple of days related to the breathing tube used during surgery.  You may use Cepacol lozenges to help with this discomfort.  If it worsens or if you develop a fever, contact your surgeon.     If you feel your pain is not well  managed with the pain medications prescribed by your surgeon, please contact your surgeon's office to let them know so they can address your concerns.         Crutch Instructions    Because of your surgery you will need crutches.  Make sure you tell your healthcare provider if crutches do not seem to work for you.  Using Crutches   Crutches are the most commonly used device for injuries to the lower part of the body because they allow the most mobility. All walking assistance devices require strength in your upper body but crutches require more coordination. If you are unable to use crutches then a cane or walker may be better.   Remember these rules when using crutches:   Always look straight ahead when you walk. Do not look down.   Hold the top padded part of the crutches into your chest near your armpits. Grasp the padded hand  and always support your weight with your arms and hands.   Do not lean on the crutches with your armpit as this could cause nerve damage.  Standing Up  Make sure you are in a stable chair. Move forward to the edge of the chair so that your  good  foot is flat on the floor. Put both crutches together and hold the handgrips in one hand. Stretch the injured leg out straight and then use the crutches with one hand and the chair armrest with the other hand to push yourself into a standing position onto your  good  leg. Once you are standing, wait until you are steady before placing a crutch in each hand. Until you become good at standing, have someone assist you in case you lose your balance. When standing still, lean slightly forward with the crutches ahead of you and about 3 feet apart. Unless you are told otherwise, you should keep weight off your injured leg.  Walking  To begin crutch walking, balance yourself on your  good  leg. Move both crutches forward about the length of one step and place them firmly on the floor in front of you about 3 feet apart. Support your weight on the  "padded hand rests while leaning forward. Press the top of the crutches against your chest wall and not into your armpits. Be sure to hold the injured leg up off of the floor. When ready, swing the \"good\" leg forward about one step length past the crutches while supporting your weight on the padded hand . Be careful not to go too far. Transfer your weight onto the \"good\" leg then bring both crutches forward about the length of one step. Repeating this motion will allow you to move fairly quickly without the use of your injured leg. Keep practicing until you become good at crutch walking.  Sitting Down  Make sure the chair you are about to sit on is stable. Walk in front of the chair such that you are facing away from it. Move back a little at a time until the back of your  good  leg is nearly touching the seat. Keeping your weight on the  good  leg, move both crutches to one hand holding onto the handgrips. Lean forward, bend your  good  knee, and move your injured leg out forward. Sit down slowly while using your free hand to reach for the chair s armrest for support. Place the crutches where you can easily get them. Never pivot, even on your  good  leg. This could cause you to fall or injure your  good  leg.  Navigating Stairs, Curbs & Door Steps  Only attempt this once you are very comfortable with regular crutch walking and only when someone is there to steady you should you begin to lose your balance. Hold on to a  railing with one hand and both crutches in the other hand or have someone carry the loose crutch for you. It does not matter which side the handrail is on. If there is no handrail, you can use both crutches. Using only crutches is the same as the railing method but maintaining your balance can be difficult. The crutch-only method is not recommended until you have become very good at crutch walking. Be sure to only take one step at a time. A simple rule to remember for crutches when " "navigating stairs or curbs: up with the  good  leg and down with the  bad .  Going Up Stairs or Curbs  Walk close to the first step with the crutches slightly behind you. Push down on the handrail and the crutch and step up with the \"good\" leg. You may have to make a short hop to do this if you are not allowed to place any weight on the injured leg. Lean forward and bring the injured leg and the crutch up beside the \"good\" leg. Repeat this until you have climbed up all of the steps. Remember, the \"good\" leg goes up first and the crutches move with the injured leg. The person helping you should stand behind and to your side that is away from the railing.  Going Down Stairs or Curbs  Walk to the edge of the first step. While standing and balancing on the  good  leg, place both crutches in one hand and then down on the step below. Be sure to support your weight by leaning on the crutches and handrail. Bring the injured leg forward, then the \"good\" leg down to the same step as the crutches. Remember crutches go down first with the injured leg. The person helping you should  front and to your side that is away from the railing.  Sitting Method for Navigating Stairs  A safer way to get up and down stairs is to sit down. To go up steps, sit down on the second or third step. Push with your  good  leg below while pushing up with both arms on the step above to move your bottom to the next step. When you get to the top of the stairs you may need to use the  scooting  method described later to get back up. To go down steps you first need to lower yourself to the floor near the top of the steps. Once seated, support yourself with your  good  leg on the second to next step below, and push with both arms on the step where you are sitting to move your bottom down to the next step. When you reach the bottom, pull yourself up to standing position using your crutches. It is helpful if someone can move your crutches and assist " "you in getting up and down. With practice it may be possible to manage on your own.  If You Start To Fall  If you start to fall and cannot get your balance, throw the crutches away from you. Try to fall onto your  good  side away from your injury and then break your fall with your arms. If uninjured, you can usually get back up by moving into a sitting position and scooting to a chair. Push with your arms and hands on the chair seat while pushing with the \"good\" leg to get up into the chair. You should not attempt to get back up if you are having significant pain. Call for assistance or dial 911 for an ambulance if necessary.  Safety Tips for Crutch Walking   At first you may want to wear a training belt (or a strong regular belt) so others can assist you.   Do not use crutches if you feel dizzy or drowsy.   Be careful on slick or wet surfaces like a kitchen or bathroom floor, snow, ice, or rainy conditions.   Temporarily remove pets, throw rugs or other items from your home that might trip you.   Do not hop around while holding onto furniture.   Wear sneakers or rubber soled shoes that will not easily slip or come off.   Be careful on ramps or slopes as they can be harder to walk up or down   Do not remove any parts from your crutches especially the padding or rubber traction footings. Replace padding or footings that become damaged immediately.   It is important to use your crutches correctly. If you feel any numbness or tingling in your arms, you are probably using the crutches incorrectly.  Helpful Hints   A bedside toilet or toilet riser may be helpful.   Always allow for extra time to get around. Children in school should be given permission to leave class early to avoid crowds when changing classes.   Elevate the injured leg when sitting.   Use a backpack to carry books or waist pouch to carry other items so that both hands are free.   Call your healthcare provider if you have any questions or concerns.     "         ** If you have questions or concerns about your procedure,   call Dr. Joseph  [phone] **

## 2022-12-27 NOTE — ANESTHESIA CARE TRANSFER NOTE
Patient: Mami Charles    Procedure: Procedure(s):  FIRST METATARSOPHALANGEAL JOINT ARTHRODESIS , RIGHT FOOT       Diagnosis: Hallux rigidus [M20.20]  Diagnosis Additional Information: No value filed.    Anesthesia Type:   General     Note:    Oropharynx: oropharynx clear of all foreign objects and spontaneously breathing  Level of Consciousness: awake  Oxygen Supplementation: face mask  Level of Supplemental Oxygen (L/min / FiO2): 6  Independent Airway: airway patency satisfactory and stable  Dentition: dentition unchanged  Vital Signs Stable: post-procedure vital signs reviewed and stable  Report to RN Given: handoff report given  Patient transferred to: PACU  Comments:     At end of procedure, spontaneous respirations, adequate tidal volumes, LMA removed atraumatically, airway patent after LMA removal. Oxygen via facemask at 6 liters per minute to PACU. Oxygen tubing connected to wall O2 in PACU, SpO2, NiBP, and EKG monitors and alarms on and functioning, report on patient's clinical status given to PACU RN, RN questions answered.      Handoff Report: Identifed the Patient, Identified the Reponsible Provider, Reviewed the pertinent medical history, Discussed the surgical course, Reviewed Intra-OP anesthesia mangement and issues during anesthesia, Set expectations for post-procedure period and Allowed opportunity for questions and acknowledgement of understanding      Vitals:  Vitals Value Taken Time   /62    Temp     Pulse 61 12/27/22 1650   Resp 10 12/27/22 1650   SpO2 100 % 12/27/22 1650   Vitals shown include unvalidated device data.    Electronically Signed By: ALESSANDRA Quintanilla CRNA  December 27, 2022  4:51 PM

## 2022-12-27 NOTE — ANESTHESIA PROCEDURE NOTES
Airway       Patient location: [facility] - Operating Room.       Procedure Start/Stop Times: 12/27/2022 2:21 PM and 12/27/2022 2:21 PM  Staff -        CRNA: Ricki Lynn APRN CRNA       Performed By: CRNA  Consent for Airway        Urgency: elective  Indications and Patient Condition       Indications for airway management: vannesa-procedural       Induction type:intravenous       Mask difficulty assessment: 0 - not attempted    Final Airway Details       Final airway type: supraglottic airway    Supraglottic Airway Details        Type: LMA       Brand: Ambu AuraGain       LMA size: 4    Post intubation assessment        Placement verified by: capnometry, equal breath sounds and chest rise        Number of attempts at approach: 1       Secured with: pink tape       Ease of procedure: easy       Dentition: Unchanged    Medication(s) Administered   Medication Administration Time: 12/27/2022 2:21 PM    Additional Comments         LMA Ambu AuraGain Size 4.

## 2022-12-28 NOTE — OP NOTE
SURGEON: YUMIKO MOORE DPM    PROCEDURE:  RIGHT foot first metatarsophalangeal joint arthrodesis     PREOPERATIVE DIAGNOSIS: RIGHT foot hallux rigidus with pain.    POSTOPERATIVE DIAGNOSIS: RIGHT foot hallux rigidus with pain.     ANESTHESIA:  General with local.     HEMOSTASIS:  Ankle tourniquet at 250 mmHg.     ESTIMATED BLOOD LOSS:  5 mL     SPECIMENS:  None.     INDICATIONS:  This is a 37-year-old female, who presented with chronic pain to the right foot due to arthritis of the first metatarsophalangeal joint. She had seen another provider 7-8 years ago but was told that she was too young to have surgery. On clinical examination, she had significant pain with palpation of the 1st metatarsophalangeal joint, and zero degree dorsiflexion with range of motion. There is palpable dorsal spur noted at the joint. On her initial visit, she received a steroid injection which helped her somewhat. However, she was looking for a permanent solution to the problem and does not want to keep receiving injections. X-ray showed significant narrowing of joint space at the first metatarsophalangeal joint with dorsal, lateral, and medial spurring at the first metatarsal head and the proximal phalanx base.  The patient was seen preoperatively.  Procedure and postoperative course were discussed as well as alternative, risks and complications.  No guarantees were given.  All questions were answered to the patient's satisfaction.  The patient agreed to comply and opts to proceed with the operation.     DESCRIPTION OF PROCEDURE:  The patient was brought into the operating room, properly identified and placed on the operating room table in a supine position.  Following administration of general anesthesia, a 10cc of 2% lidocaine was injected forming a modified Richard block around the first metatarsal. The right foot was then scrubbed, prepped and draped in the usual sterile aseptic manner. The right foot was then elevated to exsanguinate and  the previously applied well-padded supramalleolar tourniquet was inflated to 250 mmHg.     Attention was directed to the dorsal aspect of the first metatarsophalangeal joint right foot where a 6-cm incision was made overlying the first metatarsophalangeal joint medial to the extensor hallucis longus tendon.  The incision was deepened in same plane with care taken to identify and retract all vital neurovascular structures. All superficial bleeders were cauterized as necessary.  Next, a linear dorsal capsulotomy was performed overlying the first MPJ and the head of the first metatarsal as well as the base of the proximal phalanx were well visualized and exposed and freed from surrounding soft tissue attachment.  At this point, it was noted that the first metatarsophalangeal joint has significant degenerative changes to the cartilage and there was spurring medial, lateral, dorsal first metatarsal head. It was noted that the proximal phalanx base also has significant dorsal, medial and lateral exostosis.  Next, a bone rongeur was used to remove the dorsal exostosis as well as the medial hypertrophic eminence and the lateral exostosis of the first metatarsal head.  A rongeur and a rasp were used to smooth out this area. A rongeur was used to also smooth the proximal phalangeal base dorsally, medially and laterally as well as to remove any exostosis at the base of the proximal phalanx.  Next, using the reamer system of Arthrex, the cartilages of the first metatarsal head and base of the proximal phalanx were removed. The wound site was flushed with copious amount of sterile saline. Next, I then used a 2mm drill to fenestrate the first metatarsal head and the base of the proximal phalanx. The fusion site was then temporarily fixated with the Arthrex K wire for the 3.5 compression screw. Multiple views of C arm were taken to confirm the proper alignment of the hallux as well as make sure that the hallux was held in  approximately 5 degree of valgus in the frontal plane just of off the weightbearing surface in the sagittal plane and neutral frontal plane. There was noted to be minimal gapping medially and laterally and I utilized demineralized bone matrix to pack these gaps. Next, a 3.5mm compression screw of Arthrex was used to fixate the fusion site. Good bite noted. An Arthrex first metatarsophalangeal joint  plate was used to supplement the fixation and placed dorsally. A total of 3 locking screws and 1 nonlocking screw were used. The screws and plate were placed following standard AO technique and manufacture guidelines. Once again, multiple views of C arm were taken and confirm proper potion of screws and plate. The wound site was flushed with copious amount of sterile saline. Next, the capsular layer was then closed with 3-0 Vicryl, the subcutaneous tissue was closed with 3-0 Monocryl and the skin was closed with 3-0 nylon.  A postop block of 10 mL of 0.5% Marcaine plain  was injected around the incision site. The incision site was then dressed with Adaptic, 4 x 4s, ABD pad, Kerlix and Coban.  Tourniquet was deflated.  Adequate perfusion was noted to all distal digits of the left foot.  The patient tolerated anesthesia and procedure well without complication, left the OR with vital signs stable and vascular status intact to the right foot.     Following a period of postoperative monitoring, will be discharged home after being given both oral and written postoperative instructions. I was present for the entire case and performed all aspects of the procedure.

## 2023-01-02 NOTE — TELEPHONE ENCOUNTER
Roscoe Beebe Dr. has been on androgel since April 2021, and was last seen in clinic in July for 3 month follow up since being on testosterone. Total T at this time was 410. Patient is overdue for 6 month follow up. I think it is reasonable to refill for 1 month, and I will send a message to have them follow up in clinic soon for labs.    Thank you,  Cathie   Home

## 2023-04-22 ENCOUNTER — HEALTH MAINTENANCE LETTER (OUTPATIENT)
Age: 38
End: 2023-04-22

## 2024-06-29 ENCOUNTER — HEALTH MAINTENANCE LETTER (OUTPATIENT)
Age: 39
End: 2024-06-29

## (undated) DEVICE — SU ETHILON 3-0 FS-1 18" 669H

## (undated) DEVICE — SU ETHILON 3-0 FS-1 18" 663G

## (undated) DEVICE — LINEN TOWEL PACK X5 5464

## (undated) DEVICE — DRSG TEGADERM 2 3/8X2 3/4" 1624W

## (undated) DEVICE — DRAIN JACKSON PRATT 15FR ROUND SU130-1323

## (undated) DEVICE — SPONGE LAP 18X18" X8435

## (undated) DEVICE — PEN MARKING SKIN W/LABELS 31145918

## (undated) DEVICE — STRAP KNEE/BODY 31143004

## (undated) DEVICE — POSITIONER ARMBOARD FOAM 1PAIR LF FP-ARMB1

## (undated) DEVICE — BNDG ELASTIC 6"X5YDS UNSTERILE 6611-60

## (undated) DEVICE — DRSG ABDOMINAL 07 1/2X8" 7197D

## (undated) DEVICE — BLADE SAW SAGITTAL 25.5X9.5X.4MM FINE LINVATEC 5023-138

## (undated) DEVICE — STPL SKIN 35W ROTATING HEAD PRW35

## (undated) DEVICE — ESU BLADE PEAK PLASMA 3.0S PS210-030S

## (undated) DEVICE — BLADE KNIFE SURG 10 371110

## (undated) DEVICE — PREP DURAPREP 26ML APL 8630

## (undated) DEVICE — BNDG ELASTIC 4"X5YDS UNSTERILE 6611-40

## (undated) DEVICE — REAMER ARTHREX METATARSAL 18MM AR-8944MR-18

## (undated) DEVICE — SOL ADH LIQUID BENZOIN SWAB 0.6ML C1544

## (undated) DEVICE — SU VICRYL 4-0 PS-1 18" UND J682G

## (undated) DEVICE — GOWN XLG DISP 9545

## (undated) DEVICE — CLOSURE SYS SKIN PREMIERPRO EXOFINFUSION 4X60CM 3473

## (undated) DEVICE — ESU GROUND PAD UNIVERSAL W/O CORD

## (undated) DEVICE — CAST PADDING 4" UNSTERILE 9044

## (undated) DEVICE — SUCTION MANIFOLD DORNOCH ULTRA CART UL-CL500

## (undated) DEVICE — DRILL BIT ARTHREX MTP 2.0MM AR-8944-22

## (undated) DEVICE — DRAPE MINI C-ARM 4003

## (undated) DEVICE — SOL NACL 0.9% IRRIG 1000ML BOTTLE 2F7124

## (undated) DEVICE — SYR BULB IRRIG 50ML LATEX FREE 0035280

## (undated) DEVICE — CAST PADDING 4" STERILE 9044S

## (undated) DEVICE — DRAPE LAP TRANSVERSE 29421

## (undated) DEVICE — GUIDE WIRE ARTHREX W/TROCAR TIP .062" AR-8941K

## (undated) DEVICE — SOL WATER IRRIG 1000ML BOTTLE 2F7114

## (undated) DEVICE — EYE MARKING PAD 581057

## (undated) DEVICE — PAD CHUX UNDERPAD 30X36" P3036C

## (undated) DEVICE — GLOVE BIOGEL PI MICRO INDICATOR UNDERGLOVE SZ 6.5 48965

## (undated) DEVICE — REAMER ARTHREX PHALANGEAL 18MM AR-8944PR-18

## (undated) DEVICE — DRSG KERLIX 4 1/2"X4YDS ROLL 6715

## (undated) DEVICE — SU PLAIN FAST ABSORB 5-0 PC-1 18" 1915G

## (undated) DEVICE — SU VICRYL 3-0 FS-1 27" J442H

## (undated) DEVICE — CATH TRAY FOLEY SURESTEP 16FR WDRAIN BAG STLK LATEX A300316A

## (undated) DEVICE — LIGHT HANDLE X2

## (undated) DEVICE — SU MONOCRYL 3-0 PS-2 27" Y427H

## (undated) DEVICE — BLADE KNIFE SURG 15 371115

## (undated) DEVICE — DRILL BIT CANNULATED 2.7MM AR-8737-35

## (undated) DEVICE — ESU PENCIL W/SMOKE EVAC NEPTUNE STRYKER 0703-046-000

## (undated) DEVICE — SU SILK 2-0 TIE 12X30" A305H

## (undated) DEVICE — LINEN ORTHO PACK 5446

## (undated) DEVICE — GLOVE BIOGEL PI SZ 6.5 40865

## (undated) DEVICE — HOLDER PLT BB-TAK MTP NS LF DISP

## (undated) DEVICE — PACK EXTREMITY SOP15EXFSD

## (undated) DEVICE — DRSG KERLIX 4 1/2"X4YDS ROLL 6730

## (undated) DEVICE — DRAIN JACKSON PRATT RESERVOIR 100ML SU130-1305

## (undated) DEVICE — TUBING SUCTION MEDI-VAC 1/4"X20' N620A

## (undated) DEVICE — Device

## (undated) DEVICE — GUIDEWIRE TROCAR TIP 1.1MM

## (undated) DEVICE — BNDG ELASTIC 6" DBL LENGTH UNSTERILE 6611-16

## (undated) DEVICE — SU VICRYL 3-0 PS-1 18" UND J683

## (undated) DEVICE — NDL 18GA 1.5" 305196

## (undated) DEVICE — GLOVE PROTEXIS MICRO 6.5  2D73PM65

## (undated) RX ORDER — FENTANYL CITRATE 50 UG/ML
INJECTION, SOLUTION INTRAMUSCULAR; INTRAVENOUS
Status: DISPENSED
Start: 2019-09-25

## (undated) RX ORDER — KETOROLAC TROMETHAMINE 30 MG/ML
INJECTION, SOLUTION INTRAMUSCULAR; INTRAVENOUS
Status: DISPENSED
Start: 2019-09-25

## (undated) RX ORDER — ONDANSETRON 2 MG/ML
INJECTION INTRAMUSCULAR; INTRAVENOUS
Status: DISPENSED
Start: 2022-12-27

## (undated) RX ORDER — CLINDAMYCIN PHOSPHATE 900 MG/50ML
INJECTION, SOLUTION INTRAVENOUS
Status: DISPENSED
Start: 2019-09-25

## (undated) RX ORDER — PROPOFOL 10 MG/ML
INJECTION, EMULSION INTRAVENOUS
Status: DISPENSED
Start: 2022-12-27

## (undated) RX ORDER — LABETALOL HYDROCHLORIDE 5 MG/ML
INJECTION, SOLUTION INTRAVENOUS
Status: DISPENSED
Start: 2022-12-27

## (undated) RX ORDER — BUPIVACAINE HYDROCHLORIDE 2.5 MG/ML
INJECTION, SOLUTION EPIDURAL; INFILTRATION; INTRACAUDAL
Status: DISPENSED
Start: 2019-09-25

## (undated) RX ORDER — HYDROMORPHONE HYDROCHLORIDE 1 MG/ML
INJECTION, SOLUTION INTRAMUSCULAR; INTRAVENOUS; SUBCUTANEOUS
Status: DISPENSED
Start: 2019-09-25

## (undated) RX ORDER — BUPIVACAINE HYDROCHLORIDE 5 MG/ML
INJECTION, SOLUTION EPIDURAL; INTRACAUDAL
Status: DISPENSED
Start: 2022-12-27

## (undated) RX ORDER — DEXAMETHASONE SODIUM PHOSPHATE 4 MG/ML
INJECTION, SOLUTION INTRA-ARTICULAR; INTRALESIONAL; INTRAMUSCULAR; INTRAVENOUS; SOFT TISSUE
Status: DISPENSED
Start: 2019-09-25

## (undated) RX ORDER — FENTANYL CITRATE 50 UG/ML
INJECTION, SOLUTION INTRAMUSCULAR; INTRAVENOUS
Status: DISPENSED
Start: 2022-12-27

## (undated) RX ORDER — LIDOCAINE HYDROCHLORIDE 20 MG/ML
INJECTION, SOLUTION EPIDURAL; INFILTRATION; INTRACAUDAL; PERINEURAL
Status: DISPENSED
Start: 2019-09-25

## (undated) RX ORDER — ONDANSETRON 2 MG/ML
INJECTION INTRAMUSCULAR; INTRAVENOUS
Status: DISPENSED
Start: 2019-09-25

## (undated) RX ORDER — LIDOCAINE HYDROCHLORIDE 10 MG/ML
INJECTION, SOLUTION EPIDURAL; INFILTRATION; INTRACAUDAL; PERINEURAL
Status: DISPENSED
Start: 2022-12-27

## (undated) RX ORDER — DEXAMETHASONE SODIUM PHOSPHATE 4 MG/ML
INJECTION, SOLUTION INTRA-ARTICULAR; INTRALESIONAL; INTRAMUSCULAR; INTRAVENOUS; SOFT TISSUE
Status: DISPENSED
Start: 2022-12-27

## (undated) RX ORDER — CLINDAMYCIN PHOSPHATE 900 MG/50ML
INJECTION, SOLUTION INTRAVENOUS
Status: DISPENSED
Start: 2022-12-27